# Patient Record
Sex: FEMALE | Race: BLACK OR AFRICAN AMERICAN | NOT HISPANIC OR LATINO | Employment: UNEMPLOYED | URBAN - METROPOLITAN AREA
[De-identification: names, ages, dates, MRNs, and addresses within clinical notes are randomized per-mention and may not be internally consistent; named-entity substitution may affect disease eponyms.]

---

## 2023-07-12 ENCOUNTER — HOSPITAL ENCOUNTER (OUTPATIENT)
Facility: OTHER | Age: 58
Discharge: HOME | End: 2023-07-13
Attending: EMERGENCY MEDICINE | Admitting: HOSPITALIST
Payer: MEDICAID

## 2023-07-12 DIAGNOSIS — R07.9 CHEST PAIN: ICD-10-CM

## 2023-07-12 DIAGNOSIS — I21.4 NSTEMI (NON-ST ELEVATED MYOCARDIAL INFARCTION): Primary | ICD-10-CM

## 2023-07-12 PROBLEM — D72.829 LEUKOCYTOSIS: Status: ACTIVE | Noted: 2023-07-12

## 2023-07-12 PROBLEM — I10 PRIMARY HYPERTENSION: Status: ACTIVE | Noted: 2023-07-12

## 2023-07-12 PROBLEM — N17.9 AKI (ACUTE KIDNEY INJURY): Status: ACTIVE | Noted: 2023-07-12

## 2023-07-12 PROBLEM — J45.909 ASTHMA: Status: ACTIVE | Noted: 2023-07-12

## 2023-07-12 PROBLEM — E78.5 HYPERLIPIDEMIA: Status: ACTIVE | Noted: 2023-07-12

## 2023-07-12 LAB
ALBUMIN SERPL BCP-MCNC: 3.5 G/DL (ref 3.5–5.2)
ALP SERPL-CCNC: 106 U/L (ref 55–135)
ALT SERPL W/O P-5'-P-CCNC: 15 U/L (ref 10–44)
ANION GAP SERPL CALC-SCNC: 12 MMOL/L (ref 8–16)
ANISOCYTOSIS BLD QL SMEAR: SLIGHT
APTT PPP: 24.7 SEC (ref 21–32)
AST SERPL-CCNC: 17 U/L (ref 10–40)
BASOPHILS # BLD AUTO: 0.06 K/UL (ref 0–0.2)
BASOPHILS # BLD AUTO: 0.08 K/UL (ref 0–0.2)
BASOPHILS NFR BLD: 0.4 % (ref 0–1.9)
BASOPHILS NFR BLD: 0.5 % (ref 0–1.9)
BILIRUB SERPL-MCNC: 0.2 MG/DL (ref 0.1–1)
BNP SERPL-MCNC: 15 PG/ML (ref 0–99)
BUN SERPL-MCNC: 22 MG/DL (ref 6–20)
CALCIUM SERPL-MCNC: 9.4 MG/DL (ref 8.7–10.5)
CHLORIDE SERPL-SCNC: 104 MMOL/L (ref 95–110)
CO2 SERPL-SCNC: 25 MMOL/L (ref 23–29)
CREAT SERPL-MCNC: 1.6 MG/DL (ref 0.5–1.4)
DIFFERENTIAL METHOD: ABNORMAL
DIFFERENTIAL METHOD: ABNORMAL
EOSINOPHIL # BLD AUTO: 0.2 K/UL (ref 0–0.5)
EOSINOPHIL # BLD AUTO: 0.2 K/UL (ref 0–0.5)
EOSINOPHIL NFR BLD: 1.3 % (ref 0–8)
EOSINOPHIL NFR BLD: 1.3 % (ref 0–8)
ERYTHROCYTE [DISTWIDTH] IN BLOOD BY AUTOMATED COUNT: 15.6 % (ref 11.5–14.5)
ERYTHROCYTE [DISTWIDTH] IN BLOOD BY AUTOMATED COUNT: 16.4 % (ref 11.5–14.5)
EST. GFR  (NO RACE VARIABLE): 37 ML/MIN/1.73 M^2
GLUCOSE SERPL-MCNC: 109 MG/DL (ref 70–110)
HCT VFR BLD AUTO: 38.3 % (ref 37–48.5)
HCT VFR BLD AUTO: 40.4 % (ref 37–48.5)
HGB BLD-MCNC: 12.4 G/DL (ref 12–16)
HGB BLD-MCNC: 12.9 G/DL (ref 12–16)
IMM GRANULOCYTES # BLD AUTO: 0.11 K/UL (ref 0–0.04)
IMM GRANULOCYTES # BLD AUTO: 0.13 K/UL (ref 0–0.04)
IMM GRANULOCYTES NFR BLD AUTO: 0.8 % (ref 0–0.5)
IMM GRANULOCYTES NFR BLD AUTO: 0.8 % (ref 0–0.5)
INR PPP: 0.9 (ref 0.8–1.2)
LYMPHOCYTES # BLD AUTO: 6.4 K/UL (ref 1–4.8)
LYMPHOCYTES # BLD AUTO: 6.9 K/UL (ref 1–4.8)
LYMPHOCYTES NFR BLD: 43.1 % (ref 18–48)
LYMPHOCYTES NFR BLD: 44.9 % (ref 18–48)
MCH RBC QN AUTO: 28.8 PG (ref 27–31)
MCH RBC QN AUTO: 29.4 PG (ref 27–31)
MCHC RBC AUTO-ENTMCNC: 31.9 G/DL (ref 32–36)
MCHC RBC AUTO-ENTMCNC: 32.4 G/DL (ref 32–36)
MCV RBC AUTO: 89 FL (ref 82–98)
MCV RBC AUTO: 92 FL (ref 82–98)
MONOCYTES # BLD AUTO: 1 K/UL (ref 0.3–1)
MONOCYTES # BLD AUTO: 1.1 K/UL (ref 0.3–1)
MONOCYTES NFR BLD: 6.6 % (ref 4–15)
MONOCYTES NFR BLD: 6.8 % (ref 4–15)
NEUTROPHILS # BLD AUTO: 6.5 K/UL (ref 1.8–7.7)
NEUTROPHILS # BLD AUTO: 7.6 K/UL (ref 1.8–7.7)
NEUTROPHILS NFR BLD: 45.8 % (ref 38–73)
NEUTROPHILS NFR BLD: 47.7 % (ref 38–73)
NRBC BLD-RTO: 0 /100 WBC
NRBC BLD-RTO: 0 /100 WBC
PLATELET # BLD AUTO: 312 K/UL (ref 150–450)
PLATELET # BLD AUTO: 355 K/UL (ref 150–450)
PLATELET BLD QL SMEAR: ABNORMAL
PMV BLD AUTO: 8.9 FL (ref 9.2–12.9)
PMV BLD AUTO: 9.2 FL (ref 9.2–12.9)
POTASSIUM SERPL-SCNC: 3.1 MMOL/L (ref 3.5–5.1)
PROT SERPL-MCNC: 6.9 G/DL (ref 6–8.4)
PROTHROMBIN TIME: 10.4 SEC (ref 9–12.5)
RBC # BLD AUTO: 4.3 M/UL (ref 4–5.4)
RBC # BLD AUTO: 4.39 M/UL (ref 4–5.4)
SODIUM SERPL-SCNC: 141 MMOL/L (ref 136–145)
TROPONIN I SERPL DL<=0.01 NG/ML-MCNC: 2.54 NG/ML (ref 0–0.03)
TROPONIN I SERPL DL<=0.01 NG/ML-MCNC: 2.71 NG/ML (ref 0–0.03)
WBC # BLD AUTO: 14.2 K/UL (ref 3.9–12.7)
WBC # BLD AUTO: 15.92 K/UL (ref 3.9–12.7)

## 2023-07-12 PROCEDURE — 96361 HYDRATE IV INFUSION ADD-ON: CPT

## 2023-07-12 PROCEDURE — 83880 ASSAY OF NATRIURETIC PEPTIDE: CPT | Performed by: NURSE PRACTITIONER

## 2023-07-12 PROCEDURE — 93010 EKG 12-LEAD: ICD-10-PCS | Mod: ,,, | Performed by: INTERNAL MEDICINE

## 2023-07-12 PROCEDURE — 12000002 HC ACUTE/MED SURGE SEMI-PRIVATE ROOM

## 2023-07-12 PROCEDURE — 85610 PROTHROMBIN TIME: CPT | Performed by: PHYSICIAN ASSISTANT

## 2023-07-12 PROCEDURE — 93005 ELECTROCARDIOGRAM TRACING: CPT

## 2023-07-12 PROCEDURE — 96360 HYDRATION IV INFUSION INIT: CPT | Mod: 59

## 2023-07-12 PROCEDURE — 85730 THROMBOPLASTIN TIME PARTIAL: CPT | Performed by: PHYSICIAN ASSISTANT

## 2023-07-12 PROCEDURE — 99285 EMERGENCY DEPT VISIT HI MDM: CPT | Mod: 25

## 2023-07-12 PROCEDURE — G0378 HOSPITAL OBSERVATION PER HR: HCPCS

## 2023-07-12 PROCEDURE — 63600175 PHARM REV CODE 636 W HCPCS: Performed by: PHYSICIAN ASSISTANT

## 2023-07-12 PROCEDURE — 25000003 PHARM REV CODE 250: Performed by: INTERNAL MEDICINE

## 2023-07-12 PROCEDURE — 99223 1ST HOSP IP/OBS HIGH 75: CPT | Mod: ,,, | Performed by: NURSE PRACTITIONER

## 2023-07-12 PROCEDURE — 80061 LIPID PANEL: CPT | Performed by: NURSE PRACTITIONER

## 2023-07-12 PROCEDURE — 36415 COLL VENOUS BLD VENIPUNCTURE: CPT | Performed by: NURSE PRACTITIONER

## 2023-07-12 PROCEDURE — 93010 ELECTROCARDIOGRAM REPORT: CPT | Mod: ,,, | Performed by: INTERNAL MEDICINE

## 2023-07-12 PROCEDURE — 85025 COMPLETE CBC W/AUTO DIFF WBC: CPT | Mod: 91 | Performed by: PHYSICIAN ASSISTANT

## 2023-07-12 PROCEDURE — 25000003 PHARM REV CODE 250: Performed by: PHYSICIAN ASSISTANT

## 2023-07-12 PROCEDURE — 80053 COMPREHEN METABOLIC PANEL: CPT | Performed by: NURSE PRACTITIONER

## 2023-07-12 PROCEDURE — 94761 N-INVAS EAR/PLS OXIMETRY MLT: CPT

## 2023-07-12 PROCEDURE — 99223 PR INITIAL HOSPITAL CARE,LEVL III: ICD-10-PCS | Mod: ,,, | Performed by: NURSE PRACTITIONER

## 2023-07-12 PROCEDURE — 85025 COMPLETE CBC W/AUTO DIFF WBC: CPT | Performed by: NURSE PRACTITIONER

## 2023-07-12 PROCEDURE — 96365 THER/PROPH/DIAG IV INF INIT: CPT

## 2023-07-12 PROCEDURE — 84484 ASSAY OF TROPONIN QUANT: CPT | Performed by: NURSE PRACTITIONER

## 2023-07-12 RX ORDER — ATORVASTATIN CALCIUM 20 MG/1
80 TABLET, FILM COATED ORAL DAILY
Status: DISCONTINUED | OUTPATIENT
Start: 2023-07-12 | End: 2023-07-13 | Stop reason: HOSPADM

## 2023-07-12 RX ORDER — HYDROXYZINE HYDROCHLORIDE 25 MG/1
25 TABLET, FILM COATED ORAL DAILY
Status: DISCONTINUED | OUTPATIENT
Start: 2023-07-13 | End: 2023-07-13 | Stop reason: HOSPADM

## 2023-07-12 RX ORDER — LOSARTAN POTASSIUM 50 MG/1
50 TABLET ORAL DAILY
Status: ON HOLD | COMMUNITY
End: 2023-07-13 | Stop reason: HOSPADM

## 2023-07-12 RX ORDER — AMLODIPINE BESYLATE 10 MG/1
5 TABLET ORAL DAILY
COMMUNITY

## 2023-07-12 RX ORDER — FLUTICASONE FUROATE AND VILANTEROL 100; 25 UG/1; UG/1
1 POWDER RESPIRATORY (INHALATION) DAILY
Status: DISCONTINUED | OUTPATIENT
Start: 2023-07-13 | End: 2023-07-13 | Stop reason: HOSPADM

## 2023-07-12 RX ORDER — FLUOXETINE HYDROCHLORIDE 20 MG/1
40 CAPSULE ORAL DAILY
Status: DISCONTINUED | OUTPATIENT
Start: 2023-07-13 | End: 2023-07-13 | Stop reason: HOSPADM

## 2023-07-12 RX ORDER — PANTOPRAZOLE SODIUM 40 MG/1
40 TABLET, DELAYED RELEASE ORAL DAILY
Status: DISCONTINUED | OUTPATIENT
Start: 2023-07-13 | End: 2023-07-13 | Stop reason: HOSPADM

## 2023-07-12 RX ORDER — AMLODIPINE BESYLATE 5 MG/1
10 TABLET ORAL DAILY
Status: DISCONTINUED | OUTPATIENT
Start: 2023-07-13 | End: 2023-07-13 | Stop reason: HOSPADM

## 2023-07-12 RX ORDER — SODIUM CHLORIDE 0.9 % (FLUSH) 0.9 %
10 SYRINGE (ML) INJECTION
Status: DISCONTINUED | OUTPATIENT
Start: 2023-07-13 | End: 2023-07-13 | Stop reason: HOSPADM

## 2023-07-12 RX ORDER — MIRTAZAPINE 15 MG/1
15 TABLET, FILM COATED ORAL NIGHTLY
Status: DISCONTINUED | OUTPATIENT
Start: 2023-07-13 | End: 2023-07-13 | Stop reason: HOSPADM

## 2023-07-12 RX ORDER — HYDROCODONE BITARTRATE AND ACETAMINOPHEN 5; 325 MG/1; MG/1
1 TABLET ORAL EVERY 4 HOURS PRN
Status: DISCONTINUED | OUTPATIENT
Start: 2023-07-13 | End: 2023-07-13 | Stop reason: HOSPADM

## 2023-07-12 RX ORDER — ACETAMINOPHEN 325 MG/1
650 TABLET ORAL EVERY 4 HOURS PRN
Status: DISCONTINUED | OUTPATIENT
Start: 2023-07-13 | End: 2023-07-13 | Stop reason: HOSPADM

## 2023-07-12 RX ORDER — BISACODYL 10 MG
10 SUPPOSITORY, RECTAL RECTAL DAILY PRN
Status: DISCONTINUED | OUTPATIENT
Start: 2023-07-13 | End: 2023-07-13 | Stop reason: HOSPADM

## 2023-07-12 RX ORDER — MELOXICAM 7.5 MG/1
7.5 TABLET ORAL DAILY
Status: ON HOLD | COMMUNITY
End: 2023-07-13 | Stop reason: HOSPADM

## 2023-07-12 RX ORDER — SODIUM CHLORIDE 9 MG/ML
INJECTION, SOLUTION INTRAVENOUS CONTINUOUS
Status: DISCONTINUED | OUTPATIENT
Start: 2023-07-13 | End: 2023-07-13

## 2023-07-12 RX ORDER — GABAPENTIN 300 MG/1
600 CAPSULE ORAL 3 TIMES DAILY
Status: DISCONTINUED | OUTPATIENT
Start: 2023-07-13 | End: 2023-07-13 | Stop reason: HOSPADM

## 2023-07-12 RX ORDER — HEPARIN SODIUM,PORCINE/D5W 25000/250
0-40 INTRAVENOUS SOLUTION INTRAVENOUS CONTINUOUS
Status: DISCONTINUED | OUTPATIENT
Start: 2023-07-12 | End: 2023-07-13

## 2023-07-12 RX ORDER — MIRTAZAPINE 15 MG/1
15 TABLET, FILM COATED ORAL NIGHTLY
COMMUNITY

## 2023-07-12 RX ORDER — TALC
6 POWDER (GRAM) TOPICAL NIGHTLY PRN
Status: DISCONTINUED | OUTPATIENT
Start: 2023-07-13 | End: 2023-07-13 | Stop reason: HOSPADM

## 2023-07-12 RX ORDER — METOPROLOL SUCCINATE 25 MG/1
25 TABLET, EXTENDED RELEASE ORAL DAILY
Status: DISCONTINUED | OUTPATIENT
Start: 2023-07-12 | End: 2023-07-13 | Stop reason: HOSPADM

## 2023-07-12 RX ORDER — ONDANSETRON 2 MG/ML
4 INJECTION INTRAMUSCULAR; INTRAVENOUS EVERY 8 HOURS PRN
Status: DISCONTINUED | OUTPATIENT
Start: 2023-07-13 | End: 2023-07-13 | Stop reason: HOSPADM

## 2023-07-12 RX ORDER — HYDROXYZINE HYDROCHLORIDE 25 MG/1
25 TABLET, FILM COATED ORAL DAILY
COMMUNITY

## 2023-07-12 RX ORDER — ONDANSETRON 8 MG/1
8 TABLET, ORALLY DISINTEGRATING ORAL EVERY 8 HOURS PRN
Status: DISCONTINUED | OUTPATIENT
Start: 2023-07-13 | End: 2023-07-13 | Stop reason: HOSPADM

## 2023-07-12 RX ORDER — POLYETHYLENE GLYCOL 3350 17 G/17G
17 POWDER, FOR SOLUTION ORAL 2 TIMES DAILY PRN
Status: DISCONTINUED | OUTPATIENT
Start: 2023-07-13 | End: 2023-07-13 | Stop reason: HOSPADM

## 2023-07-12 RX ORDER — ATORVASTATIN CALCIUM 20 MG/1
80 TABLET, FILM COATED ORAL DAILY
Status: DISCONTINUED | OUTPATIENT
Start: 2023-07-13 | End: 2023-07-12

## 2023-07-12 RX ORDER — FLUOXETINE HYDROCHLORIDE 40 MG/1
40 CAPSULE ORAL DAILY
COMMUNITY

## 2023-07-12 RX ORDER — AMOXICILLIN 250 MG
1 CAPSULE ORAL 2 TIMES DAILY
Status: DISCONTINUED | OUTPATIENT
Start: 2023-07-13 | End: 2023-07-13 | Stop reason: HOSPADM

## 2023-07-12 RX ORDER — DIAZEPAM 5 MG/1
5 TABLET ORAL
Status: DISCONTINUED | OUTPATIENT
Start: 2023-07-13 | End: 2023-07-13

## 2023-07-12 RX ORDER — POTASSIUM CHLORIDE 750 MG/1
30 TABLET, EXTENDED RELEASE ORAL
Status: COMPLETED | OUTPATIENT
Start: 2023-07-12 | End: 2023-07-13

## 2023-07-12 RX ORDER — CETIRIZINE HYDROCHLORIDE 10 MG/1
10 TABLET ORAL DAILY
Status: DISCONTINUED | OUTPATIENT
Start: 2023-07-13 | End: 2023-07-13 | Stop reason: HOSPADM

## 2023-07-12 RX ORDER — NITROGLYCERIN 0.4 MG/1
0.4 TABLET SUBLINGUAL EVERY 5 MIN PRN
Status: DISCONTINUED | OUTPATIENT
Start: 2023-07-13 | End: 2023-07-13 | Stop reason: HOSPADM

## 2023-07-12 RX ORDER — NAPROXEN SODIUM 220 MG/1
81 TABLET, FILM COATED ORAL DAILY
Status: DISCONTINUED | OUTPATIENT
Start: 2023-07-13 | End: 2023-07-13 | Stop reason: HOSPADM

## 2023-07-12 RX ORDER — ASPIRIN 325 MG
325 TABLET, DELAYED RELEASE (ENTERIC COATED) ORAL
Status: COMPLETED | OUTPATIENT
Start: 2023-07-12 | End: 2023-07-12

## 2023-07-12 RX ADMIN — SODIUM CHLORIDE 1000 ML: 0.9 INJECTION, SOLUTION INTRAVENOUS at 08:07

## 2023-07-12 RX ADMIN — ATORVASTATIN CALCIUM 80 MG: 20 TABLET, FILM COATED ORAL at 10:07

## 2023-07-12 RX ADMIN — HEPARIN SODIUM 12 UNITS/KG/HR: 10000 INJECTION, SOLUTION INTRAVENOUS at 10:07

## 2023-07-12 RX ADMIN — ASPIRIN 325 MG: 325 TABLET, COATED ORAL at 09:07

## 2023-07-12 RX ADMIN — METOPROLOL SUCCINATE 25 MG: 25 TABLET, EXTENDED RELEASE ORAL at 10:07

## 2023-07-12 NOTE — FIRST PROVIDER EVALUATION
Emergency Department TeleTriage Encounter Note      CHIEF COMPLAINT    Chief Complaint   Patient presents with    chest heaviness     Reports right side CP that started yesterday. Endorses indigestion yesterday, took Tums with no relief. Denies SOB       VITAL SIGNS   Initial Vitals [07/12/23 1845]   BP Pulse Resp Temp SpO2   120/82 91 18 98 °F (36.7 °C) 99 %      MAP       --            ALLERGIES    Review of patient's allergies indicates:  No Known Allergies    PROVIDER TRIAGE NOTE  This is a teletriage evaluation of a 58 y.o. female presenting to the ED with c/o right sided chest pain/heaviness since last night. Limited physical exam via telehealth: The patient is awake, alert, answering questions appropriately and is not in respiratory distress. Initial orders will be placed and care will be transferred to an alternate provider when patient is roomed for a full evaluation. Any additional orders and the final disposition will be determined by that provider.         ORDERS  Labs Reviewed - No data to display    ED Orders (720h ago, onward)      Start Ordered     Status Ordering Provider    07/12/23 1856 07/12/23 1856  Vital signs  Every 15 min         Ordered NATTY TEIXEIRA    07/12/23 1856 07/12/23 1856  Cardiac Monitoring - Adult  Continuous        Comments: Notify Physician If:    Ordered NATTY TEIXEIRA    07/12/23 1856 07/12/23 1856  Pulse Oximetry Continuous  Continuous         Ordered NATTY TEIXEIRA    07/12/23 1856 07/12/23 1856  Diet NPO  Diet effective now         Ordered NATTY TEIXEIRA    Unscheduled 07/12/23 1856  Saline lock IV  Once         Ordered NATTY TEIXEIRA    Unscheduled 07/12/23 1856  EKG 12-lead  Once        Comments: Do not perform if previously done during this visit/ triage    Ordered NATTY TEIXEIRA    Unscheduled 07/12/23 1856  CBC auto differential  STAT         Ordered NATTY TEIXEIRA    Unscheduled 07/12/23 1856  Comprehensive metabolic panel  STAT         Ordered  NATTY TEIXEIRA.    Unscheduled 07/12/23 1856  Troponin I #1  STAT         Ordered NATTY TEIXEIRA    Unscheduled 07/12/23 1856  Troponin I #2  Once Timed         Ordered NATTY TEIXEIRA    Unscheduled 07/12/23 1856  B-Type natriuretic peptide (BNP)  STAT         Ordered NATTY TEIXEIRA    Unscheduled 07/12/23 1856  X-Ray Chest PA And Lateral  1 time imaging         Ordered NATTY TEIXEIRA              Virtual Visit Note: The provider triage portion of this emergency department evaluation and documentation was performed via [employer], a HIPAA-compliant telemedicine application, in concert with a tele-presenter in the room. A face to face patient evaluation with one of my colleagues will occur once the patient is placed in an emergency department room.      DISCLAIMER: This note was prepared with [employer] voice recognition transcription software. Garbled syntax, mangled pronouns, and other bizarre constructions may be attributed to that software system.

## 2023-07-13 ENCOUNTER — TELEPHONE (OUTPATIENT)
Dept: PODIATRY | Facility: CLINIC | Age: 58
End: 2023-07-13
Payer: MEDICAID

## 2023-07-13 VITALS
OXYGEN SATURATION: 100 % | RESPIRATION RATE: 18 BRPM | HEART RATE: 66 BPM | TEMPERATURE: 97 F | HEIGHT: 64 IN | WEIGHT: 213.63 LBS | BODY MASS INDEX: 36.47 KG/M2 | DIASTOLIC BLOOD PRESSURE: 69 MMHG | SYSTOLIC BLOOD PRESSURE: 137 MMHG

## 2023-07-13 PROBLEM — M54.2 CHRONIC NECK PAIN: Status: ACTIVE | Noted: 2023-07-13

## 2023-07-13 PROBLEM — F41.9 ANXIETY AND DEPRESSION: Status: ACTIVE | Noted: 2023-07-13

## 2023-07-13 PROBLEM — F32.A ANXIETY AND DEPRESSION: Status: ACTIVE | Noted: 2023-07-13

## 2023-07-13 PROBLEM — G89.29 CHRONIC NECK PAIN: Status: ACTIVE | Noted: 2023-07-13

## 2023-07-13 LAB
ABO + RH BLD: NORMAL
ALBUMIN SERPL BCP-MCNC: 3.3 G/DL (ref 3.5–5.2)
ALP SERPL-CCNC: 111 U/L (ref 55–135)
ALT SERPL W/O P-5'-P-CCNC: 13 U/L (ref 10–44)
ANION GAP SERPL CALC-SCNC: 8 MMOL/L (ref 8–16)
APTT PPP: 61.1 SEC (ref 21–32)
APTT PPP: 61.1 SEC (ref 21–32)
ASCENDING AORTA: 3.02 CM
AST SERPL-CCNC: 14 U/L (ref 10–40)
AV INDEX (PROSTH): 0.53
AV MEAN GRADIENT: 4 MMHG
AV PEAK GRADIENT: 7 MMHG
AV VALVE AREA: 1.63 CM2
AV VELOCITY RATIO: 0.53
BASOPHILS # BLD AUTO: 0.08 K/UL (ref 0–0.2)
BASOPHILS NFR BLD: 0.5 % (ref 0–1.9)
BILIRUB SERPL-MCNC: 0.4 MG/DL (ref 0.1–1)
BLD GP AB SCN CELLS X3 SERPL QL: NORMAL
BSA FOR ECHO PROCEDURE: 1.97 M2
BUN SERPL-MCNC: 21 MG/DL (ref 6–20)
CALCIUM SERPL-MCNC: 9.1 MG/DL (ref 8.7–10.5)
CHLORIDE SERPL-SCNC: 105 MMOL/L (ref 95–110)
CHOLEST SERPL-MCNC: 168 MG/DL (ref 120–199)
CHOLEST/HDLC SERPL: 3.8 {RATIO} (ref 2–5)
CO2 SERPL-SCNC: 27 MMOL/L (ref 23–29)
CREAT SERPL-MCNC: 1.1 MG/DL (ref 0.5–1.4)
CV ECHO LV RWT: 0.44 CM
DIFFERENTIAL METHOD: ABNORMAL
DOP CALC AO PEAK VEL: 1.32 M/S
DOP CALC AO VTI: 26.4 CM
DOP CALC LVOT AREA: 3 CM2
DOP CALC LVOT DIAMETER: 1.97 CM
DOP CALC LVOT PEAK VEL: 0.7 M/S
DOP CALC LVOT STROKE VOLUME: 42.96 CM3
DOP CALCLVOT PEAK VEL VTI: 14.1 CM
E WAVE DECELERATION TIME: 213.37 MSEC
E/A RATIO: 1.15
E/E' RATIO: 8.71 M/S
ECHO LV POSTERIOR WALL: 0.88 CM (ref 0.6–1.1)
EJECTION FRACTION: 62 %
EOSINOPHIL # BLD AUTO: 0.3 K/UL (ref 0–0.5)
EOSINOPHIL NFR BLD: 1.9 % (ref 0–8)
ERYTHROCYTE [DISTWIDTH] IN BLOOD BY AUTOMATED COUNT: 15.5 % (ref 11.5–14.5)
EST. GFR  (NO RACE VARIABLE): 58 ML/MIN/1.73 M^2
ESTIMATED AVG GLUCOSE: 126 MG/DL (ref 68–131)
FRACTIONAL SHORTENING: 33 % (ref 28–44)
GLUCOSE SERPL-MCNC: 95 MG/DL (ref 70–110)
HBA1C MFR BLD: 6 % (ref 4–5.6)
HCT VFR BLD AUTO: 36.5 % (ref 37–48.5)
HDLC SERPL-MCNC: 44 MG/DL (ref 40–75)
HDLC SERPL: 26.2 % (ref 20–50)
HGB BLD-MCNC: 11.7 G/DL (ref 12–16)
IMM GRANULOCYTES # BLD AUTO: 0.12 K/UL (ref 0–0.04)
IMM GRANULOCYTES NFR BLD AUTO: 0.8 % (ref 0–0.5)
INTERVENTRICULAR SEPTUM: 0.92 CM (ref 0.6–1.1)
IVRT: 121.79 MSEC
LA MAJOR: 4.34 CM
LA MINOR: 3.94 CM
LA WIDTH: 3.7 CM
LDLC SERPL CALC-MCNC: 95.2 MG/DL (ref 63–159)
LEFT ATRIUM SIZE: 2.17 CM
LEFT ATRIUM VOLUME INDEX MOD: 19.4 ML/M2
LEFT ATRIUM VOLUME INDEX: 14.8 ML/M2
LEFT ATRIUM VOLUME MOD: 37 CM3
LEFT ATRIUM VOLUME: 28.19 CM3
LEFT INTERNAL DIMENSION IN SYSTOLE: 2.7 CM (ref 2.1–4)
LEFT VENTRICLE DIASTOLIC VOLUME INDEX: 37.39 ML/M2
LEFT VENTRICLE DIASTOLIC VOLUME: 71.42 ML
LEFT VENTRICLE MASS INDEX: 58 G/M2
LEFT VENTRICLE SYSTOLIC VOLUME INDEX: 14.1 ML/M2
LEFT VENTRICLE SYSTOLIC VOLUME: 27.02 ML
LEFT VENTRICULAR INTERNAL DIMENSION IN DIASTOLE: 4.03 CM (ref 3.5–6)
LEFT VENTRICULAR MASS: 111.01 G
LV LATERAL E/E' RATIO: 10.17 M/S
LV SEPTAL E/E' RATIO: 7.63 M/S
LVOT MG: 1.06 MMHG
LVOT MV: 0.48 CM/S
LYMPHOCYTES # BLD AUTO: 7.2 K/UL (ref 1–4.8)
LYMPHOCYTES NFR BLD: 48 % (ref 18–48)
MAGNESIUM SERPL-MCNC: 2.2 MG/DL (ref 1.6–2.6)
MCH RBC QN AUTO: 28.4 PG (ref 27–31)
MCHC RBC AUTO-ENTMCNC: 32.1 G/DL (ref 32–36)
MCV RBC AUTO: 89 FL (ref 82–98)
MONOCYTES # BLD AUTO: 0.8 K/UL (ref 0.3–1)
MONOCYTES NFR BLD: 5.6 % (ref 4–15)
MV PEAK A VEL: 0.53 M/S
MV PEAK E VEL: 0.61 M/S
MV STENOSIS PRESSURE HALF TIME: 61.88 MS
MV VALVE AREA P 1/2 METHOD: 3.56 CM2
NEUTROPHILS # BLD AUTO: 6.4 K/UL (ref 1.8–7.7)
NEUTROPHILS NFR BLD: 43.2 % (ref 38–73)
NONHDLC SERPL-MCNC: 124 MG/DL
NRBC BLD-RTO: 0 /100 WBC
PISA MRMAX VEL: 2.15 M/S
PISA TR MAX VEL: 1.53 M/S
PLATELET # BLD AUTO: 318 K/UL (ref 150–450)
PMV BLD AUTO: 8.8 FL (ref 9.2–12.9)
POTASSIUM SERPL-SCNC: 4 MMOL/L (ref 3.5–5.1)
PROT SERPL-MCNC: 6.4 G/DL (ref 6–8.4)
RA MAJOR: 3.76 CM
RA WIDTH: 3.4 CM
RBC # BLD AUTO: 4.12 M/UL (ref 4–5.4)
RIGHT VENTRICULAR END-DIASTOLIC DIMENSION: 3.12 CM
SINUS: 3.1 CM
SODIUM SERPL-SCNC: 140 MMOL/L (ref 136–145)
SPECIMEN OUTDATE: NORMAL
STJ: 2.29 CM
TDI LATERAL: 0.06 M/S
TDI SEPTAL: 0.08 M/S
TDI: 0.07 M/S
TR MAX PG: 9 MMHG
TRIGL SERPL-MCNC: 144 MG/DL (ref 30–150)
TROPONIN I SERPL DL<=0.01 NG/ML-MCNC: 1.19 NG/ML (ref 0–0.03)
TROPONIN I SERPL DL<=0.01 NG/ML-MCNC: 1.61 NG/ML (ref 0–0.03)
TSH SERPL DL<=0.005 MIU/L-ACNC: 1.41 UIU/ML (ref 0.4–4)
WBC # BLD AUTO: 14.9 K/UL (ref 3.9–12.7)

## 2023-07-13 PROCEDURE — 25000003 PHARM REV CODE 250: Performed by: NURSE PRACTITIONER

## 2023-07-13 PROCEDURE — 83036 HEMOGLOBIN GLYCOSYLATED A1C: CPT | Performed by: NURSE PRACTITIONER

## 2023-07-13 PROCEDURE — 94761 N-INVAS EAR/PLS OXIMETRY MLT: CPT

## 2023-07-13 PROCEDURE — 36415 COLL VENOUS BLD VENIPUNCTURE: CPT | Performed by: NURSE PRACTITIONER

## 2023-07-13 PROCEDURE — 63600175 PHARM REV CODE 636 W HCPCS: Performed by: INTERNAL MEDICINE

## 2023-07-13 PROCEDURE — 99222 PR INITIAL HOSPITAL CARE,LEVL II: ICD-10-PCS | Mod: 25,,, | Performed by: INTERNAL MEDICINE

## 2023-07-13 PROCEDURE — 93005 ELECTROCARDIOGRAM TRACING: CPT

## 2023-07-13 PROCEDURE — 86900 BLOOD TYPING SEROLOGIC ABO: CPT | Performed by: NURSE PRACTITIONER

## 2023-07-13 PROCEDURE — 93454 CORONARY ARTERY ANGIO S&I: CPT | Performed by: INTERNAL MEDICINE

## 2023-07-13 PROCEDURE — 36415 COLL VENOUS BLD VENIPUNCTURE: CPT | Performed by: HOSPITALIST

## 2023-07-13 PROCEDURE — 93010 EKG 12-LEAD: ICD-10-PCS | Mod: ,,, | Performed by: INTERNAL MEDICINE

## 2023-07-13 PROCEDURE — 93010 ELECTROCARDIOGRAM REPORT: CPT | Mod: ,,, | Performed by: INTERNAL MEDICINE

## 2023-07-13 PROCEDURE — 99239 HOSP IP/OBS DSCHRG MGMT >30: CPT | Mod: ,,, | Performed by: HOSPITALIST

## 2023-07-13 PROCEDURE — 25000003 PHARM REV CODE 250: Performed by: INTERNAL MEDICINE

## 2023-07-13 PROCEDURE — 25000242 PHARM REV CODE 250 ALT 637 W/ HCPCS: Performed by: INTERNAL MEDICINE

## 2023-07-13 PROCEDURE — 96366 THER/PROPH/DIAG IV INF ADDON: CPT

## 2023-07-13 PROCEDURE — 85025 COMPLETE CBC W/AUTO DIFF WBC: CPT | Performed by: PHYSICIAN ASSISTANT

## 2023-07-13 PROCEDURE — C1769 GUIDE WIRE: HCPCS | Performed by: INTERNAL MEDICINE

## 2023-07-13 PROCEDURE — C1887 CATHETER, GUIDING: HCPCS | Performed by: INTERNAL MEDICINE

## 2023-07-13 PROCEDURE — 94640 AIRWAY INHALATION TREATMENT: CPT

## 2023-07-13 PROCEDURE — 93454 PR CATH PLACE/CORONARY ANGIO, IMG SUPER/INTERP: ICD-10-PCS | Mod: 26,,, | Performed by: INTERNAL MEDICINE

## 2023-07-13 PROCEDURE — 80053 COMPREHEN METABOLIC PANEL: CPT | Performed by: NURSE PRACTITIONER

## 2023-07-13 PROCEDURE — 93454 CORONARY ARTERY ANGIO S&I: CPT | Mod: 26,,, | Performed by: INTERNAL MEDICINE

## 2023-07-13 PROCEDURE — 25000242 PHARM REV CODE 250 ALT 637 W/ HCPCS: Performed by: NURSE PRACTITIONER

## 2023-07-13 PROCEDURE — G0378 HOSPITAL OBSERVATION PER HR: HCPCS

## 2023-07-13 PROCEDURE — 84443 ASSAY THYROID STIM HORMONE: CPT | Performed by: NURSE PRACTITIONER

## 2023-07-13 PROCEDURE — 99222 1ST HOSP IP/OBS MODERATE 55: CPT | Mod: 25,,, | Performed by: INTERNAL MEDICINE

## 2023-07-13 PROCEDURE — 96361 HYDRATE IV INFUSION ADD-ON: CPT | Mod: 59

## 2023-07-13 PROCEDURE — C1894 INTRO/SHEATH, NON-LASER: HCPCS | Performed by: INTERNAL MEDICINE

## 2023-07-13 PROCEDURE — 99239 PR HOSPITAL DISCHARGE DAY,>30 MIN: ICD-10-PCS | Mod: ,,, | Performed by: HOSPITALIST

## 2023-07-13 PROCEDURE — 25500020 PHARM REV CODE 255: Performed by: INTERNAL MEDICINE

## 2023-07-13 PROCEDURE — 85730 THROMBOPLASTIN TIME PARTIAL: CPT | Performed by: PHYSICIAN ASSISTANT

## 2023-07-13 PROCEDURE — 83735 ASSAY OF MAGNESIUM: CPT | Performed by: NURSE PRACTITIONER

## 2023-07-13 PROCEDURE — 84484 ASSAY OF TROPONIN QUANT: CPT | Mod: 91 | Performed by: NURSE PRACTITIONER

## 2023-07-13 RX ORDER — NITROGLYCERIN 0.4 MG/1
0.4 TABLET SUBLINGUAL EVERY 5 MIN PRN
Qty: 25 TABLET | Refills: 1 | Status: SHIPPED | OUTPATIENT
Start: 2023-07-13 | End: 2024-07-12

## 2023-07-13 RX ORDER — ONDANSETRON 8 MG/1
8 TABLET, ORALLY DISINTEGRATING ORAL EVERY 8 HOURS PRN
Status: DISCONTINUED | OUTPATIENT
Start: 2023-07-13 | End: 2023-07-13 | Stop reason: HOSPADM

## 2023-07-13 RX ORDER — VERAPAMIL HYDROCHLORIDE 2.5 MG/ML
INJECTION, SOLUTION INTRAVENOUS
Status: DISCONTINUED | OUTPATIENT
Start: 2023-07-13 | End: 2023-07-13 | Stop reason: HOSPADM

## 2023-07-13 RX ORDER — ACETAMINOPHEN 325 MG/1
650 TABLET ORAL EVERY 4 HOURS PRN
Status: DISCONTINUED | OUTPATIENT
Start: 2023-07-13 | End: 2023-07-13 | Stop reason: HOSPADM

## 2023-07-13 RX ORDER — SODIUM CHLORIDE 9 MG/ML
INJECTION, SOLUTION INTRAVENOUS CONTINUOUS
Status: DISCONTINUED | OUTPATIENT
Start: 2023-07-13 | End: 2023-07-13 | Stop reason: HOSPADM

## 2023-07-13 RX ORDER — MIDAZOLAM HYDROCHLORIDE 1 MG/ML
INJECTION INTRAMUSCULAR; INTRAVENOUS
Status: DISCONTINUED | OUTPATIENT
Start: 2023-07-13 | End: 2023-07-13 | Stop reason: HOSPADM

## 2023-07-13 RX ORDER — ATORVASTATIN CALCIUM 80 MG/1
80 TABLET, FILM COATED ORAL NIGHTLY
Qty: 30 TABLET | Refills: 1 | Status: SHIPPED | OUTPATIENT
Start: 2023-07-13 | End: 2024-07-12

## 2023-07-13 RX ORDER — HEPARIN SOD,PORCINE/0.9 % NACL 1000/500ML
INTRAVENOUS SOLUTION INTRAVENOUS
Status: DISCONTINUED | OUTPATIENT
Start: 2023-07-13 | End: 2023-07-13 | Stop reason: HOSPADM

## 2023-07-13 RX ORDER — FENTANYL CITRATE 50 UG/ML
INJECTION, SOLUTION INTRAMUSCULAR; INTRAVENOUS
Status: DISCONTINUED | OUTPATIENT
Start: 2023-07-13 | End: 2023-07-13 | Stop reason: HOSPADM

## 2023-07-13 RX ORDER — LIDOCAINE HYDROCHLORIDE 10 MG/ML
INJECTION, SOLUTION EPIDURAL; INFILTRATION; INTRACAUDAL; PERINEURAL
Status: DISCONTINUED | OUTPATIENT
Start: 2023-07-13 | End: 2023-07-13 | Stop reason: HOSPADM

## 2023-07-13 RX ORDER — NAPROXEN SODIUM 220 MG/1
81 TABLET, FILM COATED ORAL DAILY
Qty: 30 TABLET | Refills: 1 | Status: SHIPPED | OUTPATIENT
Start: 2023-07-14 | End: 2024-07-13

## 2023-07-13 RX ORDER — SODIUM CHLORIDE 9 MG/ML
INJECTION, SOLUTION INTRAVENOUS CONTINUOUS
Status: ACTIVE | OUTPATIENT
Start: 2023-07-13 | End: 2023-07-13

## 2023-07-13 RX ORDER — METOPROLOL SUCCINATE 25 MG/1
25 TABLET, EXTENDED RELEASE ORAL DAILY
Qty: 30 TABLET | Refills: 1 | Status: SHIPPED | OUTPATIENT
Start: 2023-07-14 | End: 2024-07-13

## 2023-07-13 RX ADMIN — SODIUM CHLORIDE: 9 INJECTION, SOLUTION INTRAVENOUS at 11:07

## 2023-07-13 RX ADMIN — MIRTAZAPINE 15 MG: 15 TABLET, FILM COATED ORAL at 12:07

## 2023-07-13 RX ADMIN — SODIUM CHLORIDE: 0.9 INJECTION, SOLUTION INTRAVENOUS at 01:07

## 2023-07-13 RX ADMIN — GABAPENTIN 600 MG: 300 CAPSULE ORAL at 11:07

## 2023-07-13 RX ADMIN — POTASSIUM CHLORIDE 30 MEQ: 750 TABLET, EXTENDED RELEASE ORAL at 12:07

## 2023-07-13 RX ADMIN — POTASSIUM CHLORIDE 30 MEQ: 750 TABLET, EXTENDED RELEASE ORAL at 02:07

## 2023-07-13 RX ADMIN — ASPIRIN 81 MG CHEWABLE TABLET 81 MG: 81 TABLET CHEWABLE at 11:07

## 2023-07-13 RX ADMIN — PANTOPRAZOLE SODIUM 40 MG: 40 TABLET, DELAYED RELEASE ORAL at 11:07

## 2023-07-13 RX ADMIN — METOPROLOL SUCCINATE 25 MG: 25 TABLET, EXTENDED RELEASE ORAL at 11:07

## 2023-07-13 RX ADMIN — SENNOSIDES AND DOCUSATE SODIUM 1 TABLET: 50; 8.6 TABLET ORAL at 11:07

## 2023-07-13 RX ADMIN — ATORVASTATIN CALCIUM 80 MG: 20 TABLET, FILM COATED ORAL at 11:07

## 2023-07-13 RX ADMIN — FLUOXETINE 40 MG: 20 CAPSULE ORAL at 11:07

## 2023-07-13 RX ADMIN — FLUTICASONE FUROATE AND VILANTEROL TRIFENATATE 1 PUFF: 100; 25 POWDER RESPIRATORY (INHALATION) at 08:07

## 2023-07-13 RX ADMIN — HYDROXYZINE HYDROCHLORIDE 25 MG: 25 TABLET ORAL at 11:07

## 2023-07-13 RX ADMIN — GABAPENTIN 600 MG: 300 CAPSULE ORAL at 04:07

## 2023-07-13 RX ADMIN — AMLODIPINE BESYLATE 10 MG: 5 TABLET ORAL at 11:07

## 2023-07-13 RX ADMIN — CETIRIZINE HYDROCHLORIDE 10 MG: 10 TABLET, FILM COATED ORAL at 11:07

## 2023-07-13 NOTE — PROGRESS NOTES
Patient resting well  On room air Meter dose inhaler given, no adverse reaction noted at this time

## 2023-07-13 NOTE — PLAN OF CARE
07/13/23 1314   Final Note   Assessment Type Final Discharge Note   Anticipated Discharge Disposition Home   Hospital Resources/Appts/Education Provided Provided patient/caregiver with written discharge plan information;Appointments scheduled by Navigator/Coordinator;Appointments scheduled and added to AVS   Post-Acute Status   Discharge Delays None known at this time     SW met with patient at bedside to discuss discharge plan. Patient will see PCP 7/18/2023 11AM. Sent message to cardiology for sooner appointment. All follow ups added to AVS. Patient family/friend to provide transportation home at discharge. All CM needs have been met.

## 2023-07-13 NOTE — NURSING
Patient being discharged home, instructions reviewed with patient, IV and telemetry removed, awaiting transport.

## 2023-07-13 NOTE — ASSESSMENT & PLAN NOTE
Leukocytosis  HTN  HLD   -strong family history of CAD/MI with personal risk factors of HTN, HLD, obesity  -at admission HDS and afebrile. CBC with WBC 16, H/H stable. Chemistry with K 3.1 (repleted), BUN 22, SCr 1.6. LFTs WNL. BNP 15. Troponin 2.711 >> 2.536. CXR without acute cardiopulmonary process. EKG with NSR and nonspecific ST an T wave changes (no prior for comparison).   -leukocytosis likely reactionary in setting of NSTEMI and recent illness/prednisone use  -given ASA in ED >>> continue daily dosing  -on heparin gtt per protocol >>> continue   -metoprolol initiated in ED  >> monitor for toleration  -hold home losartan due to KITA >> resume when indicated   -continue home amlodipine  -continue home atorvastatin at high intensity dosing   -continue IVF hydration  -dose/medication adjustment as appropriate   -NPO at [place]  -Cardiology consulted >>> LHC pending in AM  -TTE ordered >> please follow   -TSH, HgA1C, lipid panel in AM  -continue tele monitoring  -EKG daily and PRN concerns/chest pain  -SL NTG PRN chest pain  -PRN supportive care needed  -continue lab trending  -monitor

## 2023-07-13 NOTE — HPI
Ms. Licona is a 58 YOF with PMHx of HTN, HLD, anxiety/depression, and asthma. She presents to ED with complaints of burning mid-sternal chest and epigastric pain that began last night with radiation to R shoulder with associated nausea and diaphoresis. She took Tums last night without relief in pain. She notes that pain returned today prompting her ED visit.     She reports that she has been feeling fatigued with malaise for last few weeks and was seen two times at urgent care with diagnosis of otitis media. She initially took bactrim for OM however developed rash and was given cream, alternative antibiotic (unable to recall name), and prednisone which she completed course.      She denies SOB, MAYER,  abdominal pain, V/D, constipation, urinary symptoms or hematuria, decreases UOP, changes in bowel habits or blood in stool, decreased appetite, changes in PO intake, light headiness, dizziness, seizures, or syncope. She lives with family, uses no ambulatory aides, is independent in ADLs. Denies alcohol, tobacco, or illicit drug use. She has family history of CAD/MI in mother, father, and sister.     In the ED she is HDS and afebrile. CBC with WBC 16, H/H stable. Chemistry with K 3.1 (repleted), BUN 22, SCr 1.6. LFTs WNL. BNP 15. Troponin 2.711 >> 2.536. CXR without acute cardiopulmonary process. EKG with NSR and nonspecific ST an T wave changes (no prior for comparison). She was given ASA, initiated on heparin gtt, given IVFs, and Cardiology consulted. The patient was admitted to the Hospital Medicine Service for further evaluation and management.

## 2023-07-13 NOTE — SUBJECTIVE & OBJECTIVE
Past Medical History:   Diagnosis Date    Asthma     Hypertension        Past Surgical History:   Procedure Laterality Date    CHOLECYSTECTOMY      TUBAL LIGATION         Review of patient's allergies indicates:  No Known Allergies    No current facility-administered medications on file prior to encounter.     Current Outpatient Medications on File Prior to Encounter   Medication Sig    amLODIPine (NORVASC) 10 MG tablet Take 10 mg by mouth once daily.    atorvastatin (LIPITOR) 40 MG tablet Take 40 mg by mouth once daily.    budesonide-formoterol 160-4.5 mcg (SYMBICORT) 160-4.5 mcg/actuation HFAA Inhale 2 puffs into the lungs every 12 (twelve) hours.    FLUoxetine 40 MG capsule Take 40 mg by mouth once daily.    gabapentin (NEURONTIN) 600 MG tablet Take 600 mg by mouth 3 (three) times daily.    hydrOXYzine HCL (ATARAX) 25 MG tablet Take 25 mg by mouth once daily.    loratadine (CLARITIN) 10 mg tablet Take 10 mg by mouth once daily.    losartan (COZAAR) 50 MG tablet Take 50 mg by mouth once daily.    meloxicam (MOBIC) 7.5 MG tablet Take 7.5 mg by mouth once daily.    mirtazapine (REMERON) 15 MG tablet Take 15 mg by mouth every evening.    omeprazole (PRILOSEC) 40 MG capsule Take 40 mg by mouth once daily.    [DISCONTINUED] lisinopril-hydrochlorothiazide (PRINZIDE,ZESTORETIC) 20-12.5 mg per tablet Take 1 tablet by mouth once daily.     Family History    None       Tobacco Use    Smoking status: Former    Smokeless tobacco: Never   Substance and Sexual Activity    Alcohol use: No    Drug use: No    Sexual activity: Not on file     Review of Systems   Constitutional:  Positive for activity change, diaphoresis and fatigue. Negative for appetite change, chills, fever and unexpected weight change.   HENT:  Negative for congestion, sore throat and trouble swallowing.    Respiratory:  Positive for chest tightness. Negative for cough, shortness of breath and wheezing.    Cardiovascular:  Positive for chest pain. Negative for  palpitations and leg swelling.   Gastrointestinal:  Positive for nausea. Negative for abdominal distention, abdominal pain, constipation, diarrhea and vomiting.   Genitourinary:  Negative for decreased urine volume, difficulty urinating, dysuria, flank pain, frequency, hematuria and urgency.   Musculoskeletal:  Positive for arthralgias (R shoulder). Negative for back pain.   Skin: Negative.    Neurological:  Negative for dizziness, syncope, weakness, light-headedness and headaches.   Objective:     Vital Signs (Most Recent):  Temp: 98 °F (36.7 °C) (07/12/23 1845)  Pulse: 68 (07/12/23 2234)  Resp: 18 (07/12/23 1845)  BP: (!) 146/89 (07/12/23 2234)  SpO2: 99 % (07/12/23 1845) Vital Signs (24h Range):  Temp:  [98 °F (36.7 °C)] 98 °F (36.7 °C)  Pulse:  [68-91] 68  Resp:  [18] 18  SpO2:  [99 %] 99 %  BP: (120-146)/(82-89) 146/89     Weight: 85.7 kg (189 lb)  Body mass index is 32.44 kg/m².     Physical Exam  Vitals and nursing note reviewed.   Constitutional:       General: She is not in acute distress.     Appearance: Normal appearance. She is well-developed and normal weight.   HENT:      Head: Normocephalic and atraumatic.      Mouth/Throat:      Dentition: Normal dentition.   Eyes:      General: Lids are normal.      Extraocular Movements: Extraocular movements intact.      Conjunctiva/sclera: Conjunctivae normal.   Cardiovascular:      Rate and Rhythm: Normal rate and regular rhythm.      Heart sounds: Normal heart sounds.   Pulmonary:      Effort: Pulmonary effort is normal.      Breath sounds: Normal breath sounds.   Chest:      Chest wall: No tenderness.   Abdominal:      General: Bowel sounds are normal.      Palpations: Abdomen is soft.   Musculoskeletal:      Cervical back: Neck supple.      Right lower leg: No edema.      Left lower leg: No edema.   Skin:     General: Skin is warm and dry.      Findings: No erythema or rash.   Neurological:      Mental Status: She is alert and oriented to person, place, and  time.           Significant Labs: All pertinent labs within the past 24 hours have been reviewed.  CBC:   Recent Labs   Lab 07/12/23 1915 07/12/23  2223   WBC 14.20* 15.92*   HGB 12.4 12.9   HCT 38.3 40.4    312     CMP:   Recent Labs   Lab 07/12/23 1915      K 3.1*      CO2 25      BUN 22*   CREATININE 1.6*   CALCIUM 9.4   PROT 6.9   ALBUMIN 3.5   BILITOT 0.2   ALKPHOS 106   AST 17   ALT 15   ANIONGAP 12     Cardiac Markers:   Recent Labs   Lab 07/12/23 1915   BNP 15     Troponin:   Recent Labs   Lab 07/12/23 1915 07/12/23  2232   TROPONINI 2.711* 2.536*       Significant Imaging: I have reviewed all pertinent imaging results/findings within the past 24 hours.

## 2023-07-13 NOTE — ASSESSMENT & PLAN NOTE
-in setting of recent illness, ABX and steroid use, and home medications  -no recent priors for comparison  -continue IVF hydration overnight  -hold home losartan  -continue home gabapentin for now >> dose reduce if indicated  -hold home meloxicam   -trend labs

## 2023-07-13 NOTE — H&P
[facility] Medicine  History & Physical    Patient Name: Naomy Licona  MRN: [id number]  Patient Class: IP- Inpatient  Admission Date: 7/12/2023  Attending Physician: Yvan Mckeon MD   Primary Care Provider: Guanakito Valdez MD    Patient information was obtained from patient, past medical records and ER records.     Subjective:     Principal Problem:NSTEMI (non-ST elevated myocardial infarction)    Chief Complaint:   Chief Complaint   Patient presents with    chest heaviness     Reports right side CP that started yesterday. Endorses indigestion yesterday, took Tums with no relief. Denies SOB        HPI: Ms. Licona is a 58 YOF with PMHx of HTN, HLD, anxiety/depression, and asthma. She presents to ED with complaints of burning mid-sternal chest and epigastric pain that began last night with radiation to R shoulder with associated nausea and diaphoresis. She took Tums last night without relief in pain. She notes that pain returned today prompting her ED visit.     She reports that she has been feeling fatigued with malaise for last few weeks and was seen two times at urgent care with diagnosis of otitis media. She initially took bactrim for OM however developed rash and was given cream, alternative antibiotic (unable to recall name), and prednisone which she completed course.      She denies SOB, MAYER,  abdominal pain, V/D, constipation, urinary symptoms or hematuria, decreases UOP, changes in bowel habits or blood in stool, decreased appetite, changes in PO intake, light headiness, dizziness, seizures, or syncope. She lives with family, uses no ambulatory aides, is independent in ADLs. Denies alcohol, tobacco, or illicit drug use. She has family history of CAD/MI in mother, father, and sister.     In the ED she is HDS and afebrile. CBC with WBC 16, H/H stable. Chemistry with K 3.1 (repleted), BUN 22, SCr 1.6. LFTs WNL. BNP 15. Troponin 2.711 >> 2.536. CXR without acute cardiopulmonary  process. EKG with NSR and nonspecific ST an T wave changes (no prior for comparison). She was given ASA, initiated on heparin gtt, given IVFs, and Cardiology consulted. The patient was admitted to the Hospital Medicine Service for further evaluation and management.       Past Medical History:   Diagnosis Date    Asthma     Hypertension        Past Surgical History:   Procedure Laterality Date    CHOLECYSTECTOMY      TUBAL LIGATION         Review of patient's allergies indicates:  No Known Allergies    No current facility-administered medications on file prior to encounter.     Current Outpatient Medications on File Prior to Encounter   Medication Sig    amLODIPine (NORVASC) 10 MG tablet Take 10 mg by mouth once daily.    atorvastatin (LIPITOR) 40 MG tablet Take 40 mg by mouth once daily.    budesonide-formoterol 160-4.5 mcg (SYMBICORT) 160-4.5 mcg/actuation HFAA Inhale 2 puffs into the lungs every 12 (twelve) hours.    FLUoxetine 40 MG capsule Take 40 mg by mouth once daily.    gabapentin (NEURONTIN) 600 MG tablet Take 600 mg by mouth 3 (three) times daily.    hydrOXYzine HCL (ATARAX) 25 MG tablet Take 25 mg by mouth once daily.    loratadine (CLARITIN) 10 mg tablet Take 10 mg by mouth once daily.    losartan (COZAAR) 50 MG tablet Take 50 mg by mouth once daily.    meloxicam (MOBIC) 7.5 MG tablet Take 7.5 mg by mouth once daily.    mirtazapine (REMERON) 15 MG tablet Take 15 mg by mouth every evening.    omeprazole (PRILOSEC) 40 MG capsule Take 40 mg by mouth once daily.    [DISCONTINUED] lisinopril-hydrochlorothiazide (PRINZIDE,ZESTORETIC) 20-12.5 mg per tablet Take 1 tablet by mouth once daily.     Family History    None       Tobacco Use    Smoking status: Former    Smokeless tobacco: Never   Substance and Sexual Activity    Alcohol use: No    Drug use: No    Sexual activity: Not on file     Review of Systems   Constitutional:  Positive for activity change, diaphoresis and fatigue. Negative  for appetite change, chills, fever and unexpected weight change.   HENT:  Negative for congestion, sore throat and trouble swallowing.    Respiratory:  Positive for chest tightness. Negative for cough, shortness of breath and wheezing.    Cardiovascular:  Positive for chest pain. Negative for palpitations and leg swelling.   Gastrointestinal:  Positive for nausea. Negative for abdominal distention, abdominal pain, constipation, diarrhea and vomiting.   Genitourinary:  Negative for decreased urine volume, difficulty urinating, dysuria, flank pain, frequency, hematuria and urgency.   Musculoskeletal:  Positive for arthralgias (R shoulder). Negative for back pain.   Skin: Negative.    Neurological:  Negative for dizziness, syncope, weakness, light-headedness and headaches.   Objective:     Vital Signs (Most Recent):  Temp: 98 °F (36.7 °C) (07/12/23 1845)  Pulse: 68 (07/12/23 2234)  Resp: 18 (07/12/23 1845)  BP: (!) 146/89 (07/12/23 2234)  SpO2: 99 % (07/12/23 1845) Vital Signs (24h Range):  Temp:  [98 °F (36.7 °C)] 98 °F (36.7 °C)  Pulse:  [68-91] 68  Resp:  [18] 18  SpO2:  [99 %] 99 %  BP: (120-146)/(82-89) 146/89     Weight: 85.7 kg (189 lb)  Body mass index is 32.44 kg/m².     Physical Exam  Vitals and nursing note reviewed.   Constitutional:       General: She is not in acute distress.     Appearance: Normal appearance. She is well-developed and normal weight.   HENT:      Head: Normocephalic and atraumatic.      Mouth/Throat:      Dentition: Normal dentition.   Eyes:      General: Lids are normal.      Extraocular Movements: Extraocular movements intact.      Conjunctiva/sclera: Conjunctivae normal.   Cardiovascular:      Rate and Rhythm: Normal rate and regular rhythm.      Heart sounds: Normal heart sounds.   Pulmonary:      Effort: Pulmonary effort is normal.      Breath sounds: Normal breath sounds.   Chest:      Chest wall: No tenderness.   Abdominal:      General: Bowel sounds are normal.      Palpations:  Abdomen is soft.   Musculoskeletal:      Cervical back: Neck supple.      Right lower leg: No edema.      Left lower leg: No edema.   Skin:     General: Skin is warm and dry.      Findings: No erythema or rash.   Neurological:      Mental Status: She is alert and oriented to person, place, and time.           Significant Labs: All pertinent labs within the past 24 hours have been reviewed.  CBC:   Recent Labs   Lab 07/12/23 1915 07/12/23  2223   WBC 14.20* 15.92*   HGB 12.4 12.9   HCT 38.3 40.4    312     CMP:   Recent Labs   Lab 07/12/23 1915      K 3.1*      CO2 25      BUN 22*   CREATININE 1.6*   CALCIUM 9.4   PROT 6.9   ALBUMIN 3.5   BILITOT 0.2   ALKPHOS 106   AST 17   ALT 15   ANIONGAP 12     Cardiac Markers:   Recent Labs   Lab 07/12/23 1915   BNP 15     Troponin:   Recent Labs   Lab 07/12/23 1915 07/12/23  2232   TROPONINI 2.711* 2.536*       Significant Imaging: I have reviewed all pertinent imaging results/findings within the past 24 hours.    Assessment/Plan:     * NSTEMI (non-ST elevated myocardial infarction)  Leukocytosis  HTN  HLD   -strong family history of CAD/MI with personal risk factors of HTN, HLD, obesity  -at admission HDS and afebrile. CBC with WBC 16, H/H stable. Chemistry with K 3.1 (repleted), BUN 22, SCr 1.6. LFTs WNL. BNP 15. Troponin 2.711 >> 2.536. CXR without acute cardiopulmonary process. EKG with NSR and nonspecific ST an T wave changes (no prior for comparison).   -leukocytosis likely reactionary in setting of NSTEMI and recent illness/prednisone use  -given ASA in ED >>> continue daily dosing  -on heparin gtt per protocol >>> continue   -metoprolol initiated in ED  >> monitor for toleration  -hold home losartan due to KITA >> resume when indicated   -continue home amlodipine  -continue home atorvastatin at high intensity dosing   -continue IVF hydration  -dose/medication adjustment as appropriate   -NPO at [place]  -Cardiology consulted >>> [facility] pending in  AM  -TTE ordered >> please follow   -TSH, HgA1C, lipid panel in AM  -continue tele monitoring  -EKG daily and PRN concerns/chest pain  -SL NTG PRN chest pain  -PRN supportive care needed  -continue lab trending  -monitor     KITA (acute kidney injury)  -in setting of recent illness, ABX and steroid use, and home medications  -no recent priors for comparison  -continue IVF hydration overnight  -hold home losartan  -continue home gabapentin for now >> dose reduce if indicated  -hold home meloxicam   -trend labs     Asthma  -chronic  -controlled  -continue home symbicort  -monitor     Chronic neck pain  -chronic  -continue home gabapentin >>> dose reduce for renal function if indicated  -hold home meloxicam   -PRN supportive care as indicated  -monitor     Anxiety and depression  -chronic  -controlled  -follows with Psychiatry  -continue home prozac, mirtazipine, and hydroxyzine  -monitor       VTE Risk Mitigation (From admission, onward)         Ordered     Reason for No Pharmacological VTE Prophylaxis  Once        Question:  Reasons:  Answer:  IV Heparin w/in 24 hrs. Pre or Post-Op    07/12/23 2314     IP VTE HIGH RISK PATIENT  Once         07/12/23 2314     Place sequential compression device  Until discontinued         07/12/23 2314     heparin 25,000 units in dextrose 5% (100 units/ml) IV bolus from bag - ADDITIONAL PRN BOLUS - 60 units/kg (max bolus 4000 units)  As needed (PRN)        Question:  Heparin Infusion Adjustment (DO NOT MODIFY ANSWER)  Answer:  \\[facility]\epic\Images\Pharmacy\HeparinInfusions\heparin LOW INTENSITY nomogram for [facility] [id number].pdf    07/12/23 2124     heparin 25,000 units in dextrose 5% (100 units/ml) IV bolus from bag - ADDITIONAL PRN BOLUS - 30 units/kg (max bolus 4000 units)  As needed (PRN)        Question:  Heparin Infusion Adjustment (DO NOT MODIFY ANSWER)  Answer:  \\[facility]\epic\Images\Pharmacy\HeparinInfusions\heparin LOW INTENSITY nomogram for [facility] [id number].pdf    07/12/23 2124      heparin 25,000 units in dextrose 5% 250 mL (100 units/mL) infusion LOW INTENSITY nomogram - [facility]  Continuous        Question Answer Comment   Heparin Infusion Adjustment (DO NOT MODIFY ANSWER) \\[email or web address]\epic\Images\Pharmacy\HeparinInfusions\heparin LOW INTENSITY nomogram for [facility] [id number].[email or web address]    Begin at (in units/kg/hr) 12 07/12/23 2125                Shahla Ramirez, JONATHON, AG-ACNP, BC  Department of Hospital Medicine  [facility]

## 2023-07-13 NOTE — ED NOTES
Pt lying in bed, respirations even, unlabored, NAD noted, answering questions appropriately. Pt updated on plan of care, placed on monitoring. Callbell within reach. Will continue to monitor

## 2023-07-13 NOTE — PROGRESS NOTES
Pt remain stable and comfortable, TR Band aseptically removed per MD prder, no bleeding / hematoma, Pacu discharge criteria met

## 2023-07-13 NOTE — DISCHARGE INSTRUCTIONS
Take all medications as prescribed.  Eat a strict low salt cardiac and diabetic diet.  Follow up with your physicians as scheduled - pcp within 1 week.  Thank you for trusting [facility] and Dr. Mckeon with your care.  We are honored that you entrusted us with your healthcare needs. Your satisfaction is very important to us and we hope you have been very pleased with your experience at [facility]. After your discharge you may receive a survey asking you to rate your hospital experience. We encourage you to take the time to complete the survey as your feedback allows us to identify areas for improvement as well as recognize our staff.   We hope that you have received the very best care possible during your hospitalization at [facility], as your satisfaction is our top priority.

## 2023-07-13 NOTE — ASSESSMENT & PLAN NOTE
-chronic  -controlled  -follows with Psychiatry  -continue home prozac, mirtazipine, and hydroxyzine  -monitor

## 2023-07-13 NOTE — INTERVAL H&P NOTE
The patient has been examined and the H&P has been reviewed:    I concur with the findings and no changes have occurred since H&P was written.    Anesthesia/Surgery risks, benefits and alternative options discussed and understood by patient/family.          Active Hospital Problems    Diagnosis  POA    *NSTEMI (non-ST elevated myocardial infarction) [I21.4]  Unknown    Anxiety and depression [F41.9, F32.A]  Unknown    Chronic neck pain [M54.2, G89.29]  Unknown    Primary hypertension [I10]  Unknown    Hyperlipidemia [E78.5]  Unknown    Asthma [J45.909]  Unknown    Leukocytosis [D72.829]  Unknown    KITA (acute kidney injury) [N17.9]  Unknown      Resolved Hospital Problems   No resolved problems to display.

## 2023-07-13 NOTE — ED NOTES
Pt arrives to Ed with c/o chest pain, burning with onset lastnight. Pt reports feeling nauseated lastnight, took tums with no relief of symptoms. Pt reports pain continued today. Pt reports feeling fatigued. Pt lying in bed, respirations even, unlabored, NAD noted, answering questions appropriately.

## 2023-07-13 NOTE — ED PROVIDER NOTES
Encounter Date: 7/12/2023       History     Chief Complaint   Patient presents with    chest heaviness     Reports right side CP that started yesterday. Endorses indigestion yesterday, took Tums with no relief. Denies SOB     Afebrile 58-year-old female with PMH of asthma, hypertension reported hyperlipidemia presents the ED for evaluation of epigastric and midsternal chest pain.  Patient reports for the past few weeks she is had fatigue and generalized malaise which she attributed to otitis media.  She states last night while resting she began with some indigestion.  She took Tums however she had no relief.  States that she would an episode at that time where she had nausea and diaphoresis.  States it lasted few minutes and resolved on own volition.  She states she came for evaluation today as upon wakening she continued with some indigestion however denies any recurrent episodes of diaphoresis, shortness of breath, nausea, vomiting.  She does continue with some right shoulder heaviness.  Denies any numbness, tingling or additional complaints.  She is tried no medications today for the symptoms.    The history is provided by the patient.   Review of patient's allergies indicates:  No Known Allergies  Past Medical History:   Diagnosis Date    Asthma     Hypertension      Past Surgical History:   Procedure Laterality Date    CHOLECYSTECTOMY      TUBAL LIGATION       No family history on file.  Social History     Tobacco Use    Smoking status: Former    Smokeless tobacco: Never   Substance Use Topics    Alcohol use: No    Drug use: No     Review of Systems  See HPI  Physical Exam     Initial Vitals [07/12/23 1845]   BP Pulse Resp Temp SpO2   120/82 91 18 98 °F (36.7 °C) 99 %      MAP       --         Physical Exam    Nursing note and vitals reviewed.  Constitutional: Vital signs are normal. She appears well-developed and well-nourished. She is cooperative.  Non-toxic appearance. She does not appear ill. No distress.    HENT:   Head: Normocephalic and atraumatic.   Eyes: Conjunctivae and lids are normal.   Neck: Neck supple.   Cardiovascular:  Normal rate and regular rhythm.           Pulmonary/Chest: Breath sounds normal. No respiratory distress. She has no wheezes. She has no rhonchi.   Abdominal: Abdomen is soft. Bowel sounds are normal. There is no abdominal tenderness. There is no rigidity and no guarding.   Musculoskeletal:         General: Normal range of motion.      Cervical back: Neck supple. No rigidity.     Neurological: She is alert and oriented to person, place, and time. She has normal strength. GCS score is 15. GCS eye subscore is 4. GCS verbal subscore is 5. GCS motor subscore is 6.   Skin: Skin is warm, dry and intact. No rash noted.   Psychiatric: She has a normal mood and affect. Her speech is normal and behavior is normal. Thought content normal.       ED Course   Procedures  Labs Reviewed   CBC W/ AUTO DIFFERENTIAL - Abnormal; Notable for the following components:       Result Value    WBC 14.20 (*)     RDW 15.6 (*)     MPV 8.9 (*)     Immature Granulocytes 0.8 (*)     Immature Grans (Abs) 0.11 (*)     Lymph # 6.4 (*)     All other components within normal limits   COMPREHENSIVE METABOLIC PANEL - Abnormal; Notable for the following components:    Potassium 3.1 (*)     BUN 22 (*)     Creatinine 1.6 (*)     eGFR 37 (*)     All other components within normal limits   TROPONIN I - Abnormal; Notable for the following components:    Troponin I 2.711 (*)     All other components within normal limits   B-TYPE NATRIURETIC PEPTIDE   TROPONIN I   APTT   PROTIME-INR   CBC W/ AUTO DIFFERENTIAL          Imaging Results              X-Ray Chest AP Portable (Final result)  Result time 07/12/23 20:36:24   Procedure changed from X-Ray Chest PA And Lateral     Final result by Luanne Talbert MD (07/12/23 20:36:24)                   Impression:      No acute cardiopulmonary process identified.      Electronically signed  by: Luanne Talbert MD  Date:    07/12/2023  Time:    20:36               Narrative:    EXAMINATION:  XR CHEST AP PORTABLE    CLINICAL HISTORY:  Chest Pain;    TECHNIQUE:  Single frontal view of the chest was performed.    COMPARISON:  March 2015.    FINDINGS:  Cardiac silhouette is normal in size.  Lungs are symmetrically expanded.  No evidence of focal consolidative process, pneumothorax, or significant pleural effusion.  No acute osseous abnormality identified.                                       Medications   sodium chloride 0.9% bolus 1,000 mL 1,000 mL (1,000 mLs Intravenous New Bag 7/12/23 2057)   heparin 25,000 units in dextrose 5% (100 units/ml) IV bolus from bag INITIAL BOLUS (max bolus 4000 units) (has no administration in time range)   heparin 25,000 units in dextrose 5% 250 mL (100 units/mL) infusion LOW INTENSITY nomogram - [facility] (has no administration in time range)   heparin 25,000 units in dextrose 5% (100 units/ml) IV bolus from bag - ADDITIONAL PRN BOLUS - 60 units/kg (max bolus 4000 units) (has no administration in time range)   heparin 25,000 units in dextrose 5% (100 units/ml) IV bolus from bag - ADDITIONAL PRN BOLUS - 30 units/kg (max bolus 4000 units) (has no administration in time range)   aspirin EC tablet 325 mg (325 mg Oral Given 7/12/23 2111)     Medical Decision Making:   History:   Old Medical Records: I decided to obtain old medical records.  Old Records Summarized: other records.  Initial Assessment:   Emergent evaluation of 58-year-old female presenting with chest pain.  Symptoms began last night.  She appears comfortable in no distress.  Exam grossly unremarkable this time  Differential Diagnosis:   Differential Diagnosis includes, but is not limited to:  ACS/MI, PE, aortic dissection, pneumothorax, cardiac tamponade, pericarditis/myocarditis, pneumonia, infection/abscess, lung mass, trauma/fracture, costochondritis/pleurisy, MSK pain/contusion, GERD, biliary disease, pancreatitis,  anemia    Clinical Tests:   Lab Tests: Reviewed  Radiological Study: Reviewed and Ordered  Medical Tests: Reviewed and Ordered  ED Management:  EKG reveals some ST wave changes.  No STEMI.  Labs placed from tele triage process.  Initial labs notable for leukocytosis however no left shift.  Maybe related to steroids and mild dehydration.  Will continue to monitor.  Creatinine noted to be elevated at 1.6.  No recent to compare to hence will give IV fluids and maybe related to some dehydration once again reflective in this, potassium and leukocytosis.  Noting patient is on HCTZ.  BNP within normal limits.  Chest x-ray with no focal consolidation.  Troponin did result in his noted to be elevated at 2.711.  Aspirin ordered. Discussed with cardiology who recommends heparin infusion.  Will keep NPO at midnight.  Patient will go to angiogram in the morning.  Serial troponin is ordered.  Discussed with Hospital Medicine and he is agreeable to plan.  Discussed with patient she is agreeable.  Stable at time of admission.  Additional MDM:   Heart Score:    History:          Moderately suspicious.  ECG:             Nonspecific repolarisation disturbance  Age:               45-65 years  Risk factors: 1-2 risk factors  Troponin:       >2x normal limit  Final Score: 6                       Clinical Impression:   Final diagnoses:  [R07.9] Chest pain  [I21.4] NSTEMI (non-ST elevated myocardial infarction)        ED Disposition Condition    Admit                 MAXIMILIAN Leiva  07/12/23 2149       MAXIMILIAN Leiva  07/12/23 2149

## 2023-07-13 NOTE — ASSESSMENT & PLAN NOTE
-chronic  -continue home gabapentin >>> dose reduce for renal function if indicated  -hold home meloxicam   -PRN supportive care as indicated  -monitor

## 2023-07-13 NOTE — CONSULTS
Cardiology Consult  7/13/2023  8:09 AM    Attending Cardiologist: Jordan Uriostegui M.D.  Primary Care Provider: Guanakito Valdez MD  Chief Complaint/Reason For Consultation:  NSTEMI      Problem list  Patient Active Problem List   Diagnosis    NSTEMI (non-ST elevated myocardial infarction)    Primary hypertension    Hyperlipidemia    Asthma    Leukocytosis    KITA (acute kidney injury)    Anxiety and depression    Chronic neck pain       CC:  CP    HPI:  Naomy Licona is a 58 y.o.year-old female with PMH of HTN, asthma, HLP, ex-smoker (quit 10 years ago) who presented to the emergency department last night with episode of chest pain with right arm numbness.  Two nights ago, she started having sharp chest pain which radiated to her R arm associated with diaphoresis.  Patient thought it was indigestion and tried Tums without any relief.  The pain was intermittent on it long.  She woke up yesterday feeling fatigue.  She decided to come to the emergency room because of the persistent right arm pain.  She was treated in the emergency room with aspirin, IV heparin.  Her troponins were 2.7, 2.5 and 1.6.  Currently she is asymptomatic.  Her EKG shows sinus rhythm with nonspecific ST changes.  She denies any prior cardiac problems.  She denies any history of bleeding.  She denies any TIA or CVA.    Medications  Current Facility-Administered Medications   Medication Dose Route Frequency Provider Last Rate Last Admin    0.9%  NaCl infusion   Intravenous Continuous Shahla Ramirez NP   Stopped at 07/13/23 0735    acetaminophen tablet 650 mg  650 mg Oral Q4H PRN Shahla Ramirez NP        amLODIPine tablet 10 mg  10 mg Oral Daily Shahla Ramirez NP        aspirin chewable tablet 81 mg  81 mg Oral Daily Jordan Uriostegui MD        atorvastatin tablet 80 mg  80 mg Oral Daily Jordan Uriostegui MD   80 mg at 07/12/23 2234    bisacodyL suppository 10 mg  10 mg Rectal Daily PRN Shahla Ramirez NP         cetirizine tablet 10 mg  10 mg Oral Daily Shahla Ramirez NP        diazePAM tablet 5 mg  5 mg Oral On Call Procedure Jordan Uriostegui MD        FLUoxetine capsule 40 mg  40 mg Oral Daily Shahla Ramirez NP        fluticasone furoate-vilanteroL 100-25 mcg/dose diskus inhaler 1 puff  1 puff Inhalation Daily Shahla Ramirez NP   1 puff at 07/13/23 0800    gabapentin capsule 600 mg  600 mg Oral TID Shahla Ramirez NP        heparin 25,000 units in dextrose 5% (100 units/ml) IV bolus from bag - ADDITIONAL PRN BOLUS - 30 units/kg (max bolus 4000 units)  30 Units/kg (Adjusted) Intravenous PRN MAXIMILIAN Leiva        heparin 25,000 units in dextrose 5% (100 units/ml) IV bolus from bag - ADDITIONAL PRN BOLUS - 60 units/kg (max bolus 4000 units)  59.6 Units/kg (Adjusted) Intravenous PRN MAXIMILIAN Leiva        heparin 25,000 units in dextrose 5% 250 mL (100 units/mL) infusion LOW INTENSITY nomogram - [facility]  0-40 Units/kg/hr (Adjusted) Intravenous Continuous MAXIMILIAN Leiva   Stopped at 07/13/23 0735    HYDROcodone-acetaminophen 5-325 mg per tablet 1 tablet  1 tablet Oral Q4H PRN Shahla Ramirez NP        hydrOXYzine HCL tablet 25 mg  25 mg Oral Daily Shahla Ramirez NP        melatonin tablet 6 mg  6 mg Oral Nightly PRN Shahla Ramirez NP        metoprolol succinate (TOPROL-XL) 24 hr tablet 25 mg  25 mg Oral Daily Jordan Uriostegui MD   25 mg at 07/12/23 2234    mirtazapine tablet 15 mg  15 mg Oral QHS Shahla Ramirez NP   15 mg at 07/13/23 0037    nitroGLYCERIN SL tablet 0.4 mg  0.4 mg Sublingual Q5 Min PRN Shahla Ramirez NP        ondansetron disintegrating tablet 8 mg  8 mg Oral Q8H PRN Shahla Ramirez NP        ondansetron injection 4 mg  4 mg Intravenous Q8H PRN Shahla Ramirez, AMY        pantoprazole EC tablet 40 mg  40 mg Oral Daily Shahla Ramirez, AMY        polyethylene glycol packet 17 g  17 g Oral BID PRN Shahla Ramirez, AMY        senna-docusate  8.6-50 mg per tablet 1 tablet  1 tablet Oral BID Shahla Ramirez, NP        sodium chloride 0.9% flush 10 mL  10 mL Intravenous PRN Shahla Ramirez NP         Current Outpatient Medications   Medication Sig Dispense Refill    amLODIPine (NORVASC) 10 MG tablet Take 10 mg by mouth once daily.      atorvastatin (LIPITOR) 40 MG tablet Take 40 mg by mouth once daily.      budesonide-formoterol 160-4.5 mcg (SYMBICORT) 160-4.5 mcg/actuation HFAA Inhale 2 puffs into the lungs every 12 (twelve) hours.      FLUoxetine 40 MG capsule Take 40 mg by mouth once daily.      gabapentin (NEURONTIN) 600 MG tablet Take 600 mg by mouth 3 (three) times daily.      hydrOXYzine HCL (ATARAX) 25 MG tablet Take 25 mg by mouth once daily.      loratadine (CLARITIN) 10 mg tablet Take 10 mg by mouth once daily.      losartan (COZAAR) 50 MG tablet Take 50 mg by mouth once daily.      meloxicam (MOBIC) 7.5 MG tablet Take 7.5 mg by mouth once daily.      mirtazapine (REMERON) 15 MG tablet Take 15 mg by mouth every evening.      omeprazole (PRILOSEC) 40 MG capsule Take 40 mg by mouth once daily.        Prior to Admission medications    Medication Sig Start Date End Date Taking? Authorizing Provider   amLODIPine (NORVASC) 10 MG tablet Take 10 mg by mouth once daily.    Historical Provider   atorvastatin (LIPITOR) 40 MG tablet Take 40 mg by mouth once daily.    Historical Provider   budesonide-formoterol 160-4.5 mcg (SYMBICORT) 160-4.5 mcg/actuation HFAA Inhale 2 puffs into the lungs every 12 (twelve) hours.    Historical Provider   FLUoxetine 40 MG capsule Take 40 mg by mouth once daily.    Historical Provider   gabapentin (NEURONTIN) 600 MG tablet Take 600 mg by mouth 3 (three) times daily.    Historical Provider   hydrOXYzine HCL (ATARAX) 25 MG tablet Take 25 mg by mouth once daily.    Historical Provider   loratadine (CLARITIN) 10 mg tablet Take 10 mg by mouth once daily.    Historical Provider   losartan (COZAAR) 50 MG tablet Take 50 mg by  mouth once daily.    Historical Provider   meloxicam (MOBIC) 7.5 MG tablet Take 7.5 mg by mouth once daily.    Historical Provider   mirtazapine (REMERON) 15 MG tablet Take 15 mg by mouth every evening.    Historical Provider   omeprazole (PRILOSEC) 40 MG capsule Take 40 mg by mouth once daily.    Historical Provider         History  Past Medical History:   Diagnosis Date    Asthma     Hypertension      Past Surgical History:   Procedure Laterality Date    CHOLECYSTECTOMY      TUBAL LIGATION       Social History     Socioeconomic History    Marital status:    Tobacco Use    Smoking status: Former    Smokeless tobacco: Never   Substance and Sexual Activity    Alcohol use: No    Drug use: No         Allergies  Review of patient's allergies indicates:  No Known Allergies      Review of Systems   Review of Systems   Constitutional: Negative for decreased appetite, fever and weight loss.   HENT:  Negative for congestion and nosebleeds.    Eyes:  Negative for double vision, vision loss in left eye, vision loss in right eye and visual disturbance.   Cardiovascular:  Positive for chest pain. Negative for claudication, cyanosis, dyspnea on exertion, irregular heartbeat, leg swelling, near-syncope, orthopnea, palpitations, paroxysmal nocturnal dyspnea and syncope.   Respiratory:  Negative for cough, hemoptysis, shortness of breath, sleep disturbances due to breathing, snoring, sputum production and wheezing.    Endocrine: Negative for cold intolerance and heat intolerance.   Skin:  Negative for nail changes and rash.   Musculoskeletal:  Negative for joint pain, muscle cramps, muscle weakness and myalgias.   Gastrointestinal:  Negative for change in bowel habit, heartburn, hematemesis, hematochezia, hemorrhoids and melena.   Neurological:  Negative for dizziness, focal weakness and headaches.       Physical Exam  Wt Readings from Last 1 Encounters:   07/12/23 85.7 kg (189 lb)     BP Readings from Last 3 Encounters:    07/13/23 130/79   03/08/15 126/70     Pulse Readings from Last 1 Encounters:   07/13/23 78     Body mass index is 32.44 kg/m².    Physical Exam  Constitutional:       Appearance: She is well-developed.   HENT:      Head: Atraumatic.   Eyes:      General: No scleral icterus.  Neck:      Vascular: Normal carotid pulses. No carotid bruit, hepatojugular reflux or JVD.   Cardiovascular:      Rate and Rhythm: Normal rate and regular rhythm.      Chest Wall: PMI is not displaced.      Pulses: Intact distal pulses.           Carotid pulses are 2+ on the right side and 2+ on the left side.       Radial pulses are 2+ on the right side and 2+ on the left side.        Dorsalis pedis pulses are 2+ on the right side and 2+ on the left side.      Heart sounds: Normal heart sounds, S1 normal and S2 normal. No murmur heard.    No friction rub.   Pulmonary:      Effort: Pulmonary effort is normal. No respiratory distress.      Breath sounds: Normal breath sounds. No stridor. No wheezing or rales.   Chest:      Chest wall: No tenderness.   Abdominal:      General: Bowel sounds are normal.      Palpations: Abdomen is soft.   Musculoskeletal:      Cervical back: Neck supple. No edema.   Skin:     General: Skin is warm and dry.      Nails: There is no clubbing.   Neurological:      Mental Status: She is alert and oriented to person, place, and time.   Psychiatric:         Behavior: Behavior normal.         Thought Content: Thought content normal.         Laboratory:  Trended Lab Data:  Recent Labs   Lab 07/12/23 1915 07/12/23 2223 07/13/23 0459   WBC 14.20* 15.92* 14.90*   HGB 12.4 12.9 11.7*   HCT 38.3 40.4 36.5*    312 318       Recent Labs   Lab 07/12/23 1915 07/13/23 0459    140   K 3.1* 4.0    105   CO2 25 27   BUN 22* 21*    95   CALCIUM 9.4 9.1   MG  --  2.2       Recent Labs   Lab 07/12/23 1915 07/13/23 0459   PROT 6.9 6.4   ALBUMIN 3.5 3.3*   BILITOT 0.2 0.4   AST 17 14   ALT 15 13   ALKPHOS  106 111       Recent Labs   Lab 07/12/23 2223   INR 0.9       Cardiac:   Recent Labs   Lab 07/12/23  1915 07/12/23  2232 07/13/23  0459   TROPONINI 2.711* 2.536* 1.611*   BNP 15  --   --        FLP: No results found for: CHOL, HDL, LDLCALC, TRIG, CHOLHDL  DM:   Lab Results   Component Value Date    CREATININE 1.1 07/13/2023     Thyroid:   Lab Results   Component Value Date    TSH 1.411 07/13/2023     Anemia: No results found for: IRON, TIBC, FERRITIN, [id number], FOLATE  Urinalysis: No results found for: LABURIN, COLORU, CLARITYU, SPECGRAV, LABSPEC, NITRITE, PROTEINUR, GLUCOSEU, KETONESU, UROBILINOGEN, BILIRUBINUR, BLOODU  @    Other Results:  EKG (my interpretation):    TELEMETRY:  sinus    Echo: No results found for this or any previous visit.    Radiology:  X-Ray Chest AP Portable    Result Date: 7/12/2023  EXAMINATION: XR CHEST AP PORTABLE CLINICAL HISTORY: Chest Pain; TECHNIQUE: Single frontal view of the chest was performed. COMPARISON: March 2015. FINDINGS: Cardiac silhouette is normal in size.  Lungs are symmetrically expanded.  No evidence of focal consolidative process, pneumothorax, or significant pleural effusion.  No acute osseous abnormality identified.     No acute cardiopulmonary process identified. Electronically signed by: Luanne Talbert MD Date:    07/12/2023 Time:    20:36        Current Medications:     Infusions:   sodium chloride 0.9% Stopped (07/13/23 0735)    heparin (porcine) in D5W Stopped (07/13/23 0735)        Scheduled:   amLODIPine  10 mg Oral Daily    aspirin  81 mg Oral Daily    atorvastatin  80 mg Oral Daily    cetirizine  10 mg Oral Daily    FLUoxetine  40 mg Oral Daily    fluticasone furoate-vilanteroL  1 puff Inhalation Daily    gabapentin  600 mg Oral TID    hydrOXYzine HCL  25 mg Oral Daily    metoprolol succinate  25 mg Oral Daily    mirtazapine  15 mg Oral QHS    pantoprazole  40 mg Oral Daily    senna-docusate 8.6-50 mg  1 tablet Oral BID        PRN:  acetaminophen,  bisacodyL, diazePAM, heparin (PORCINE), heparin (PORCINE), HYDROcodone-acetaminophen, melatonin, nitroGLYCERIN, ondansetron, ondansetron, polyethylene glycol, sodium chloride 0.9%      Assessment and Plan:  ACS/NSTEMI, peaked troponin of 2.7 and down to 1.6.  -treated with ASA, IV heparin, high-intensity statin  -discussed her diagnosis of NSTEMI.  The risks, benefits, indications and alternatives of the planned procedure were discussed in details.  Discussed with patient the risks and benefits of the procedure including, but not limited to, the following; 1:1000 risk of heart attack, stroke and death with 3-5% risk of bleeding, vessel damage (in the arms, legs or in the heart), and the need for emergent surgery, including open heart surgery.  All questions were answered.  The patient agreed to proceed.     HTN  -continue home meds, monitor and adjust    HLP  -atorvastatin 80 mg daily    KITA, improved Cr to 1.1 today from 1.6      Thank you for allowing me to participate in the care of Naomy Licona.      Jordan Uriostegui MD, F.A.C.C, F.S.C.A.I.    Disclaimer: This document was created using voice recognition software (M*Modal Fluency Direct). Although it may be edited, this document may contain errors related to incorrect recognition of the spoken word. Please call the physician if clarification is needed.

## 2023-07-13 NOTE — TELEPHONE ENCOUNTER
Left message for patient to give a callback.      ----- Message from Breanne Lauren sent at 7/13/2023  1:14 PM CDT -----  Regarding: hospital follow up  Name of caller: Naomy       What is the requesting detail: pt is requesting a call back to schedule a hospital follow up please advise       Can the clinic reply by [facility]:       What number to call back:[phone]

## 2023-07-13 NOTE — NURSING TRANSFER
Nursing Transfer Note      7/13/2023   10:19 AM    Nurse giving handoff:post op  Nurse receiving handoff:rn room 366    Reason patient is being transferred: post op    Transfer To: room 366    Transfer via stretcher    Transfer with     Transported by transport    Transfer Vital Signs:  Blood Pressure:99/59  Heart Rate:62  O2:  Temperature:87.7  Respirations:16    Telemetry:   Order for Tele Monitor?     Additional Lines:     4eyes on Skin:     Medicines sent: no    Any special needs or follow-up needed:     Patient belongings transferred with patient: Yes    Chart send with patient: Yes    Notified: family    Patient reassessed at:  (date, time)  1  Upon arrival to floor:

## 2023-07-13 NOTE — NURSING
Nurses Note -- 4 Eyes      7/13/2023   12:23 PM      Skin assessed during: Transfer      [x] No Altered Skin Integrity Present    [x]Prevention Measures Documented      [] Yes- Altered Skin Integrity Present or Discovered   [] LDA Added if Not in Epic (Describe Wound)   [] New Altered Skin Integrity was Present on Admit and Documented in LDA   [] Wound Image Taken    Wound Care Consulted? No    Attending Nurse:  Lianne Glynn RN     Second RN/Staff Member:  BELLE Lacy

## 2023-07-13 NOTE — H&P (VIEW-ONLY)
Cardiology Consult  7/13/2023  8:09 AM    Attending Cardiologist: Jordan Uriostegui M.D.  Primary Care Provider: Guanakito Valdez MD  Chief Complaint/Reason For Consultation:  NSTEMI      Problem list  Patient Active Problem List   Diagnosis    NSTEMI (non-ST elevated myocardial infarction)    Primary hypertension    Hyperlipidemia    Asthma    Leukocytosis    KITA (acute kidney injury)    Anxiety and depression    Chronic neck pain       CC:  CP    HPI:  Naomy Licona is a 58 y.o.year-old female with PMH of HTN, asthma, HLP, ex-smoker (quit 10 years ago) who presented to the emergency department last night with episode of chest pain with right arm numbness.  Two nights ago, she started having sharp chest pain which radiated to her R arm associated with diaphoresis.  Patient thought it was indigestion and tried Tums without any relief.  The pain was intermittent on it long.  She woke up yesterday feeling fatigue.  She decided to come to the emergency room because of the persistent right arm pain.  She was treated in the emergency room with aspirin, IV heparin.  Her troponins were 2.7, 2.5 and 1.6.  Currently she is asymptomatic.  Her EKG shows sinus rhythm with nonspecific ST changes.  She denies any prior cardiac problems.  She denies any history of bleeding.  She denies any TIA or CVA.    Medications  Current Facility-Administered Medications   Medication Dose Route Frequency Provider Last Rate Last Admin    0.9%  NaCl infusion   Intravenous Continuous Shahla Ramirez NP   Stopped at 07/13/23 0735    acetaminophen tablet 650 mg  650 mg Oral Q4H PRN Shahla Ramirez NP        amLODIPine tablet 10 mg  10 mg Oral Daily Shahla Ramirez NP        aspirin chewable tablet 81 mg  81 mg Oral Daily Jordan Uriostegui MD        atorvastatin tablet 80 mg  80 mg Oral Daily Jordan Uriostegui MD   80 mg at 07/12/23 2234    bisacodyL suppository 10 mg  10 mg Rectal Daily PRN Shahla Ramirez NP         cetirizine tablet 10 mg  10 mg Oral Daily Shahla Ramirez NP        diazePAM tablet 5 mg  5 mg Oral On Call Procedure Jordan Uriostegui MD        FLUoxetine capsule 40 mg  40 mg Oral Daily Shahla Ramirez NP        fluticasone furoate-vilanteroL 100-25 mcg/dose diskus inhaler 1 puff  1 puff Inhalation Daily Shahla Ramirez NP   1 puff at 07/13/23 0800    gabapentin capsule 600 mg  600 mg Oral TID Shahla Ramirez NP        heparin 25,000 units in dextrose 5% (100 units/ml) IV bolus from bag - ADDITIONAL PRN BOLUS - 30 units/kg (max bolus 4000 units)  30 Units/kg (Adjusted) Intravenous PRN MAXIMILIAN Leiva        heparin 25,000 units in dextrose 5% (100 units/ml) IV bolus from bag - ADDITIONAL PRN BOLUS - 60 units/kg (max bolus 4000 units)  59.6 Units/kg (Adjusted) Intravenous PRN MAXIMILIAN Leiva        heparin 25,000 units in dextrose 5% 250 mL (100 units/mL) infusion LOW INTENSITY nomogram - [facility]  0-40 Units/kg/hr (Adjusted) Intravenous Continuous MAXIMILIAN Leiva   Stopped at 07/13/23 0735    HYDROcodone-acetaminophen 5-325 mg per tablet 1 tablet  1 tablet Oral Q4H PRN Shahla Ramirez NP        hydrOXYzine HCL tablet 25 mg  25 mg Oral Daily Shahla Ramirez NP        melatonin tablet 6 mg  6 mg Oral Nightly PRN Shahla Ramirez NP        metoprolol succinate (TOPROL-XL) 24 hr tablet 25 mg  25 mg Oral Daily Jordan Uriostegui MD   25 mg at 07/12/23 2234    mirtazapine tablet 15 mg  15 mg Oral QHS Shahla Ramirez NP   15 mg at 07/13/23 0037    nitroGLYCERIN SL tablet 0.4 mg  0.4 mg Sublingual Q5 Min PRN Shahla Ramirez NP        ondansetron disintegrating tablet 8 mg  8 mg Oral Q8H PRN Shahla Ramirez NP        ondansetron injection 4 mg  4 mg Intravenous Q8H PRN Shahla Ramirez, AMY        pantoprazole EC tablet 40 mg  40 mg Oral Daily Shahla Ramirez, AMY        polyethylene glycol packet 17 g  17 g Oral BID PRN Shahla Ramirez, AMY        senna-docusate  8.6-50 mg per tablet 1 tablet  1 tablet Oral BID Shahla Ramirez, NP        sodium chloride 0.9% flush 10 mL  10 mL Intravenous PRN Shahla Ramirez NP         Current Outpatient Medications   Medication Sig Dispense Refill    amLODIPine (NORVASC) 10 MG tablet Take 10 mg by mouth once daily.      atorvastatin (LIPITOR) 40 MG tablet Take 40 mg by mouth once daily.      budesonide-formoterol 160-4.5 mcg (SYMBICORT) 160-4.5 mcg/actuation HFAA Inhale 2 puffs into the lungs every 12 (twelve) hours.      FLUoxetine 40 MG capsule Take 40 mg by mouth once daily.      gabapentin (NEURONTIN) 600 MG tablet Take 600 mg by mouth 3 (three) times daily.      hydrOXYzine HCL (ATARAX) 25 MG tablet Take 25 mg by mouth once daily.      loratadine (CLARITIN) 10 mg tablet Take 10 mg by mouth once daily.      losartan (COZAAR) 50 MG tablet Take 50 mg by mouth once daily.      meloxicam (MOBIC) 7.5 MG tablet Take 7.5 mg by mouth once daily.      mirtazapine (REMERON) 15 MG tablet Take 15 mg by mouth every evening.      omeprazole (PRILOSEC) 40 MG capsule Take 40 mg by mouth once daily.        Prior to Admission medications    Medication Sig Start Date End Date Taking? Authorizing Provider   amLODIPine (NORVASC) 10 MG tablet Take 10 mg by mouth once daily.    Historical Provider   atorvastatin (LIPITOR) 40 MG tablet Take 40 mg by mouth once daily.    Historical Provider   budesonide-formoterol 160-4.5 mcg (SYMBICORT) 160-4.5 mcg/actuation HFAA Inhale 2 puffs into the lungs every 12 (twelve) hours.    Historical Provider   FLUoxetine 40 MG capsule Take 40 mg by mouth once daily.    Historical Provider   gabapentin (NEURONTIN) 600 MG tablet Take 600 mg by mouth 3 (three) times daily.    Historical Provider   hydrOXYzine HCL (ATARAX) 25 MG tablet Take 25 mg by mouth once daily.    Historical Provider   loratadine (CLARITIN) 10 mg tablet Take 10 mg by mouth once daily.    Historical Provider   losartan (COZAAR) 50 MG tablet Take 50 mg by  mouth once daily.    Historical Provider   meloxicam (MOBIC) 7.5 MG tablet Take 7.5 mg by mouth once daily.    Historical Provider   mirtazapine (REMERON) 15 MG tablet Take 15 mg by mouth every evening.    Historical Provider   omeprazole (PRILOSEC) 40 MG capsule Take 40 mg by mouth once daily.    Historical Provider         History  Past Medical History:   Diagnosis Date    Asthma     Hypertension      Past Surgical History:   Procedure Laterality Date    CHOLECYSTECTOMY      TUBAL LIGATION       Social History     Socioeconomic History    Marital status:    Tobacco Use    Smoking status: Former    Smokeless tobacco: Never   Substance and Sexual Activity    Alcohol use: No    Drug use: No         Allergies  Review of patient's allergies indicates:  No Known Allergies      Review of Systems   Review of Systems   Constitutional: Negative for decreased appetite, fever and weight loss.   HENT:  Negative for congestion and nosebleeds.    Eyes:  Negative for double vision, vision loss in left eye, vision loss in right eye and visual disturbance.   Cardiovascular:  Positive for chest pain. Negative for claudication, cyanosis, dyspnea on exertion, irregular heartbeat, leg swelling, near-syncope, orthopnea, palpitations, paroxysmal nocturnal dyspnea and syncope.   Respiratory:  Negative for cough, hemoptysis, shortness of breath, sleep disturbances due to breathing, snoring, sputum production and wheezing.    Endocrine: Negative for cold intolerance and heat intolerance.   Skin:  Negative for nail changes and rash.   Musculoskeletal:  Negative for joint pain, muscle cramps, muscle weakness and myalgias.   Gastrointestinal:  Negative for change in bowel habit, heartburn, hematemesis, hematochezia, hemorrhoids and melena.   Neurological:  Negative for dizziness, focal weakness and headaches.       Physical Exam  Wt Readings from Last 1 Encounters:   07/12/23 85.7 kg (189 lb)     BP Readings from Last 3 Encounters:    07/13/23 130/79   03/08/15 126/70     Pulse Readings from Last 1 Encounters:   07/13/23 78     Body mass index is 32.44 kg/m².    Physical Exam  Constitutional:       Appearance: She is well-developed.   HENT:      Head: Atraumatic.   Eyes:      General: No scleral icterus.  Neck:      Vascular: Normal carotid pulses. No carotid bruit, hepatojugular reflux or JVD.   Cardiovascular:      Rate and Rhythm: Normal rate and regular rhythm.      Chest Wall: PMI is not displaced.      Pulses: Intact distal pulses.           Carotid pulses are 2+ on the right side and 2+ on the left side.       Radial pulses are 2+ on the right side and 2+ on the left side.        Dorsalis pedis pulses are 2+ on the right side and 2+ on the left side.      Heart sounds: Normal heart sounds, S1 normal and S2 normal. No murmur heard.    No friction rub.   Pulmonary:      Effort: Pulmonary effort is normal. No respiratory distress.      Breath sounds: Normal breath sounds. No stridor. No wheezing or rales.   Chest:      Chest wall: No tenderness.   Abdominal:      General: Bowel sounds are normal.      Palpations: Abdomen is soft.   Musculoskeletal:      Cervical back: Neck supple. No edema.   Skin:     General: Skin is warm and dry.      Nails: There is no clubbing.   Neurological:      Mental Status: She is alert and oriented to person, place, and time.   Psychiatric:         Behavior: Behavior normal.         Thought Content: Thought content normal.         Laboratory:  Trended Lab Data:  Recent Labs   Lab 07/12/23 1915 07/12/23 2223 07/13/23 0459   WBC 14.20* 15.92* 14.90*   HGB 12.4 12.9 11.7*   HCT 38.3 40.4 36.5*    312 318       Recent Labs   Lab 07/12/23 1915 07/13/23 0459    140   K 3.1* 4.0    105   CO2 25 27   BUN 22* 21*    95   CALCIUM 9.4 9.1   MG  --  2.2       Recent Labs   Lab 07/12/23 1915 07/13/23 0459   PROT 6.9 6.4   ALBUMIN 3.5 3.3*   BILITOT 0.2 0.4   AST 17 14   ALT 15 13   ALKPHOS  106 111       Recent Labs   Lab 07/12/23 2223   INR 0.9       Cardiac:   Recent Labs   Lab 07/12/23  1915 07/12/23  2232 07/13/23  0459   TROPONINI 2.711* 2.536* 1.611*   BNP 15  --   --        FLP: No results found for: CHOL, HDL, LDLCALC, TRIG, CHOLHDL  DM:   Lab Results   Component Value Date    CREATININE 1.1 07/13/2023     Thyroid:   Lab Results   Component Value Date    TSH 1.411 07/13/2023     Anemia: No results found for: IRON, TIBC, FERRITIN, [id number], FOLATE  Urinalysis: No results found for: LABURIN, COLORU, CLARITYU, SPECGRAV, LABSPEC, NITRITE, PROTEINUR, GLUCOSEU, KETONESU, UROBILINOGEN, BILIRUBINUR, BLOODU  @    Other Results:  EKG (my interpretation):    TELEMETRY:  sinus    Echo: No results found for this or any previous visit.    Radiology:  X-Ray Chest AP Portable    Result Date: 7/12/2023  EXAMINATION: XR CHEST AP PORTABLE CLINICAL HISTORY: Chest Pain; TECHNIQUE: Single frontal view of the chest was performed. COMPARISON: March 2015. FINDINGS: Cardiac silhouette is normal in size.  Lungs are symmetrically expanded.  No evidence of focal consolidative process, pneumothorax, or significant pleural effusion.  No acute osseous abnormality identified.     No acute cardiopulmonary process identified. Electronically signed by: Luanne Talbert MD Date:    07/12/2023 Time:    20:36        Current Medications:     Infusions:   sodium chloride 0.9% Stopped (07/13/23 0735)    heparin (porcine) in D5W Stopped (07/13/23 0735)        Scheduled:   amLODIPine  10 mg Oral Daily    aspirin  81 mg Oral Daily    atorvastatin  80 mg Oral Daily    cetirizine  10 mg Oral Daily    FLUoxetine  40 mg Oral Daily    fluticasone furoate-vilanteroL  1 puff Inhalation Daily    gabapentin  600 mg Oral TID    hydrOXYzine HCL  25 mg Oral Daily    metoprolol succinate  25 mg Oral Daily    mirtazapine  15 mg Oral QHS    pantoprazole  40 mg Oral Daily    senna-docusate 8.6-50 mg  1 tablet Oral BID        PRN:  acetaminophen,  bisacodyL, diazePAM, heparin (PORCINE), heparin (PORCINE), HYDROcodone-acetaminophen, melatonin, nitroGLYCERIN, ondansetron, ondansetron, polyethylene glycol, sodium chloride 0.9%      Assessment and Plan:  ACS/NSTEMI, peaked troponin of 2.7 and down to 1.6.  -treated with ASA, IV heparin, high-intensity statin  -discussed her diagnosis of NSTEMI.  The risks, benefits, indications and alternatives of the planned procedure were discussed in details.  Discussed with patient the risks and benefits of the procedure including, but not limited to, the following; 1:1000 risk of heart attack, stroke and death with 3-5% risk of bleeding, vessel damage (in the arms, legs or in the heart), and the need for emergent surgery, including open heart surgery.  All questions were answered.  The patient agreed to proceed.     HTN  -continue home meds, monitor and adjust    HLP  -atorvastatin 80 mg daily    KITA, improved Cr to 1.1 today from 1.6      Thank you for allowing me to participate in the care of Naomy Licona.      Jordan Uriostegui MD, F.A.C.C, F.S.C.A.I.    Disclaimer: This document was created using voice recognition software (M*Modal Fluency Direct). Although it may be edited, this document may contain errors related to incorrect recognition of the spoken word. Please call the physician if clarification is needed.

## 2023-07-13 NOTE — ED NOTES
PATIENT APTT RESULTED 61.1SEC PER PROTOCOL NO CHANGE IN HEPARIN DRIP IN THERAPEUTIC RANGE AT THIS TIME

## 2023-07-13 NOTE — ED NOTES
Pt rounding complete.  Patient resting in bed. Aaox4. Respirations even and unlabored. Pt denies chest pain or shortness of breath. Currently on heparin infusion at 12 units/hr per nomogram.  Restroom and comfort needs addressed.  Pt updated on plan of care.  Call light within reach.  Will continue to monitor.

## 2023-07-18 ENCOUNTER — TELEPHONE (OUTPATIENT)
Dept: PODIATRY | Facility: CLINIC | Age: 58
End: 2023-07-18
Payer: MEDICAID

## 2023-07-18 NOTE — TELEPHONE ENCOUNTER
Staff gave patient number to Cardiologist Department. Staff informed patient the message was routed to wrong Department.    Patient verbalized understanding.      ----- Message from Meghana Huang sent at 7/18/2023  9:15 AM CDT -----  Regarding: Return Call  Who Called: BRENDAN LEBRON [[phone]]         Who Left Message for Patient: Tamika         Does the patient know what this is regarding? Yes; schedule         Best Call Back Number: [phone]         Additional Information: Patient is returning a call.  Please assist.

## 2023-07-23 NOTE — DISCHARGE SUMMARY
[facility] Surg [address] Medicine  Discharge Summary      Patient Name: Naomy Licona  MRN: [id number]  LIAT: [id number]  Patient Class: OP- Observation  Admission Date: 7/12/2023  Hospital Length of Stay: 0 days  Discharge Date and Time: 7/13/2023  7:43 PM  Attending Physician: No att. providers found   Discharging Provider: Yvan Mckeon MD  Primary Care Provider: Guanakito Valdez MD    Primary Care Team: Networked reference to record PCT     HPI:   Ms. Licona is a 58 YOF with PMHx of HTN, HLD, anxiety/depression, and asthma. She presents to ED with complaints of burning mid-sternal chest and epigastric pain that began last night with radiation to R shoulder with associated nausea and diaphoresis. She took Tums last night without relief in pain. She notes that pain returned today prompting her ED visit.     She reports that she has been feeling fatigued with malaise for last few weeks and was seen two times at urgent care with diagnosis of otitis media. She initially took bactrim for OM however developed rash and was given cream, alternative antibiotic (unable to recall name), and prednisone which she completed course.      She denies SOB, MAYER,  abdominal pain, V/D, constipation, urinary symptoms or hematuria, decreases UOP, changes in bowel habits or blood in stool, decreased appetite, changes in PO intake, light headiness, dizziness, seizures, or syncope. She lives with family, uses no ambulatory aides, is independent in ADLs. Denies alcohol, tobacco, or illicit drug use. She has family history of CAD/MI in mother, father, and sister.     In the ED she is HDS and afebrile. CBC with WBC 16, H/H stable. Chemistry with K 3.1 (repleted), BUN 22, SCr 1.6. LFTs WNL. BNP 15. Troponin 2.711 >> 2.536. CXR without acute cardiopulmonary process. EKG with NSR and nonspecific ST an T wave changes (no prior for comparison). She was given ASA, initiated on heparin gtt, given IVFs, and Cardiology  consulted. The patient was admitted to the Hospital Medicine Service for further evaluation and management.       Procedure(s) (LRB):  ANGIOGRAM, CORONARY ARTERY (N/A)      Hospital Course:   Placed in observation and diagnosed with NSTEMI, KITA and hypokalemia.  Cardiology consulted and input appreciated.  Taken for angiogram which showed normal coronary arteries.  Symptoms resolved and she was cleared for discharge.  Plan to continue with aspirin, atorvastatin, metoprolo and prn NTG in addition to home medications.  Potassium replaced and normal at discharge.  Cr 1.6 on admit.  Treated with IV fluids and Cr normal at discharge.  Follow up with pcp and cardiology.    Goals of Care Treatment Preferences:  Code Status: Full Code      Consults:   Consults (From admission, onward)        Status Ordering Provider     Inpatient consult to Cardiology  Once        Provider:  (Not yet assigned)    Completed AMEYA TAVERA          No new Assessment & Plan notes have been filed under this hospital service since the last note was generated.  Service: Hospital Medicine    Final Active Diagnoses:    Diagnosis Date Noted POA    PRINCIPAL PROBLEM:  NSTEMI (non-ST elevated myocardial infarction) [I21.4] 07/12/2023 Yes    Anxiety and depression [F41.9, F32.A] 07/13/2023 Yes    Chronic neck pain [M54.2, G89.29] 07/13/2023 Unknown    Primary hypertension [I10] 07/12/2023 Yes    Hyperlipidemia [E78.5] 07/12/2023 Yes    Asthma [J45.909] 07/12/2023 Yes    Leukocytosis [D72.829] 07/12/2023 Yes    KITA (acute kidney injury) [N17.9] 07/12/2023 Yes      Problems Resolved During this Admission:       Discharged Condition: stable    Disposition: Home or Self Care    Follow Up:   Follow-up Information     Guanakito Valdez MD Follow up on 7/18/2023.    Specialty: Family Medicine  Why: 11:00am  Contact information:  [address] [facility] [id number] [phone]                        Patient Instructions:      Ambulatory referral/consult to Cardiology   Standing Status: Future   Referral Priority: Routine Referral Type: Consultation   Referral Reason: Specialty Services Required   Referred to Provider: GIANNI WILSON Requested Specialty: Cardiology   Number of Visits Requested: 1     Diet diabetic     Diet Cardiac     Notify your health care provider if you experience any of the following:  increased confusion or weakness     Notify your health care provider if you experience any of the following:  persistent dizziness, light-headedness, or visual disturbances     Notify your health care provider if you experience any of the following:  worsening rash     Notify your health care provider if you experience any of the following:  severe persistent headache     Notify your health care provider if you experience any of the following:  difficulty breathing or increased cough     Notify your health care provider if you experience any of the following:  severe uncontrolled pain     Notify your health care provider if you experience any of the following:  persistent nausea and vomiting or diarrhea     Notify your health care provider if you experience any of the following:  temperature >100.4     Activity as tolerated       Significant Diagnostic Studies: Labs:   BMP: No results for input(s): GLU, NA, K, CL, CO2, BUN, CREATININE, CALCIUM, MG in the last 48 hours., CMP No results for input(s): NA, K, CL, CO2, GLU, BUN, CREATININE, CALCIUM, PROT, ALBUMIN, BILITOT, ALKPHOS, AST, ALT, ANIONGAP, ESTGFRAFRICA, EGFRNONAA in the last 48 hours., CBC No results for input(s): WBC, HGB, HCT, PLT in the last 48 hours., INR   Lab Results   Component Value Date    INR 0.9 07/12/2023   , Lipid Panel   Lab Results   Component Value Date    CHOL 168 07/12/2023    HDL 44 07/12/2023    LDLCALC 95.2 07/12/2023    TRIG 144 07/12/2023    CHOLHDL 26.2 07/12/2023   , Troponin No results for input(s): TROPONINI in the last  168 hours. and A1C:   Recent Labs   Lab 07/13/23  0459   HGBA1C 6.0*       Pending Diagnostic Studies:     None         Medications:  Reconciled Home Medications:      Medication List      START taking these medications    aspirin 81 MG Chew  Take 1 tablet (81 mg total) by mouth once daily.     metoprolol succinate 25 MG 24 hr tablet  Commonly known as: TOPROL-XL  Take 1 tablet (25 mg total) by mouth once daily.     nitroGLYCERIN 0.4 MG SL tablet  Commonly known as: NITROSTAT  Place 1 tablet (0.4 mg total) under the tongue every 5 (five) minutes as needed for Chest pain.        CHANGE how you take these medications    atorvastatin 80 MG tablet  Commonly known as: LIPITOR  Take 1 tablet (80 mg total) by mouth every evening.  What changed:   · medication strength  · how much to take  · when to take this        CONTINUE taking these medications    amLODIPine 10 MG tablet  Commonly known as: NORVASC  Take 10 mg by mouth once daily.     budesonide-formoterol 160-4.5 mcg 160-4.5 mcg/actuation Hfaa  Commonly known as: SYMBICORT  Inhale 2 puffs into the lungs every 12 (twelve) hours.     FLUoxetine 40 MG capsule  Take 40 mg by mouth once daily.     gabapentin 600 MG tablet  Commonly known as: NEURONTIN  Take 600 mg by mouth 3 (three) times daily.     hydrOXYzine HCL 25 MG tablet  Commonly known as: ATARAX  Take 25 mg by mouth once daily.     loratadine 10 mg tablet  Commonly known as: CLARITIN  Take 10 mg by mouth once daily.     mirtazapine 15 MG tablet  Commonly known as: REMERON  Take 15 mg by mouth every evening.     omeprazole 40 MG capsule  Commonly known as: PRILOSEC  Take 40 mg by mouth once daily.        STOP taking these medications    losartan 50 MG tablet  Commonly known as: COZAAR     meloxicam 7.5 MG tablet  Commonly known as: MOBIC            Indwelling Lines/Drains at time of discharge:   Lines/Drains/Airways     None                 Time spent on the discharge of patient: 35 minutes         Yvan Mckeon,  MD  Department of Hospital Medicine  [facility] ([address])

## 2023-08-24 DIAGNOSIS — M50.30 DEGENERATION, INTERVERTEBRAL DISC, CERVICAL: ICD-10-CM

## 2023-08-24 DIAGNOSIS — R29.2 HYPERREFLEXIA: ICD-10-CM

## 2023-08-24 DIAGNOSIS — M48.02 CERVICAL STENOSIS OF SPINAL CANAL: ICD-10-CM

## 2023-08-24 DIAGNOSIS — M48.02 FORAMINAL STENOSIS OF CERVICAL REGION: Primary | ICD-10-CM

## 2023-10-16 PROBLEM — I21.4 NSTEMI (NON-ST ELEVATED MYOCARDIAL INFARCTION): Status: RESOLVED | Noted: 2023-07-12 | Resolved: 2023-10-16

## 2023-10-16 PROBLEM — N17.9 AKI (ACUTE KIDNEY INJURY): Status: RESOLVED | Noted: 2023-07-12 | Resolved: 2023-10-16

## 2023-10-30 ENCOUNTER — TELEPHONE (OUTPATIENT)
Dept: CARDIOLOGY | Facility: CLINIC | Age: 58
End: 2023-10-30
Payer: MEDICAID

## 2023-10-31 ENCOUNTER — OFFICE VISIT (OUTPATIENT)
Dept: CARDIOLOGY | Facility: CLINIC | Age: 58
End: 2023-10-31
Payer: MEDICAID

## 2023-10-31 VITALS
BODY MASS INDEX: 34.97 KG/M2 | WEIGHT: 203.69 LBS | SYSTOLIC BLOOD PRESSURE: 122 MMHG | DIASTOLIC BLOOD PRESSURE: 82 MMHG | HEART RATE: 74 BPM | OXYGEN SATURATION: 99 %

## 2023-10-31 DIAGNOSIS — I10 PRIMARY HYPERTENSION: ICD-10-CM

## 2023-10-31 DIAGNOSIS — I21.4 NSTEMI (NON-ST ELEVATED MYOCARDIAL INFARCTION): ICD-10-CM

## 2023-10-31 DIAGNOSIS — E78.5 HYPERLIPIDEMIA, UNSPECIFIED HYPERLIPIDEMIA TYPE: Primary | ICD-10-CM

## 2023-10-31 PROCEDURE — 99214 OFFICE O/P EST MOD 30 MIN: CPT | Mod: S$PBB,,, | Performed by: INTERNAL MEDICINE

## 2023-10-31 PROCEDURE — 3008F BODY MASS INDEX DOCD: CPT | Mod: CPTII,,, | Performed by: INTERNAL MEDICINE

## 2023-10-31 PROCEDURE — 3044F HG A1C LEVEL LT 7.0%: CPT | Mod: CPTII,,, | Performed by: INTERNAL MEDICINE

## 2023-10-31 PROCEDURE — 99999 PR PBB SHADOW E&M-EST. PATIENT-LVL III: CPT | Mod: PBBFAC,,, | Performed by: INTERNAL MEDICINE

## 2023-10-31 PROCEDURE — 99214 PR OFFICE/OUTPT VISIT, EST, LEVL IV, 30-39 MIN: ICD-10-PCS | Mod: S$PBB,,, | Performed by: INTERNAL MEDICINE

## 2023-10-31 PROCEDURE — 3074F PR MOST RECENT SYSTOLIC BLOOD PRESSURE < 130 MM HG: ICD-10-PCS | Mod: CPTII,,, | Performed by: INTERNAL MEDICINE

## 2023-10-31 PROCEDURE — 99999 PR PBB SHADOW E&M-EST. PATIENT-LVL III: ICD-10-PCS | Mod: PBBFAC,,, | Performed by: INTERNAL MEDICINE

## 2023-10-31 PROCEDURE — 3079F PR MOST RECENT DIASTOLIC BLOOD PRESSURE 80-89 MM HG: ICD-10-PCS | Mod: CPTII,,, | Performed by: INTERNAL MEDICINE

## 2023-10-31 PROCEDURE — 3074F SYST BP LT 130 MM HG: CPT | Mod: CPTII,,, | Performed by: INTERNAL MEDICINE

## 2023-10-31 PROCEDURE — 3044F PR MOST RECENT HEMOGLOBIN A1C LEVEL <7.0%: ICD-10-PCS | Mod: CPTII,,, | Performed by: INTERNAL MEDICINE

## 2023-10-31 PROCEDURE — 3079F DIAST BP 80-89 MM HG: CPT | Mod: CPTII,,, | Performed by: INTERNAL MEDICINE

## 2023-10-31 PROCEDURE — 3008F PR BODY MASS INDEX (BMI) DOCUMENTED: ICD-10-PCS | Mod: CPTII,,, | Performed by: INTERNAL MEDICINE

## 2023-10-31 PROCEDURE — 1159F PR MEDICATION LIST DOCUMENTED IN MEDICAL RECORD: ICD-10-PCS | Mod: CPTII,,, | Performed by: INTERNAL MEDICINE

## 2023-10-31 PROCEDURE — 99213 OFFICE O/P EST LOW 20 MIN: CPT | Mod: PBBFAC,PN | Performed by: INTERNAL MEDICINE

## 2023-10-31 PROCEDURE — 1159F MED LIST DOCD IN RCRD: CPT | Mod: CPTII,,, | Performed by: INTERNAL MEDICINE

## 2023-10-31 RX ORDER — ERGOCALCIFEROL 1.25 MG/1
50000 CAPSULE ORAL
COMMUNITY
Start: 2023-09-14

## 2023-10-31 RX ORDER — CETIRIZINE HYDROCHLORIDE 10 MG/1
10 TABLET ORAL DAILY
COMMUNITY

## 2023-10-31 RX ORDER — MONTELUKAST SODIUM 10 MG/1
10 TABLET ORAL
COMMUNITY
Start: 2023-10-09

## 2023-10-31 RX ORDER — EZETIMIBE 10 MG/1
10 TABLET ORAL
COMMUNITY
Start: 2023-09-25

## 2023-10-31 NOTE — PROGRESS NOTES
Cardiology    10/31/2023  10:41 AM    Problem list  Patient Active Problem List   Diagnosis    Primary hypertension    Hyperlipidemia    Asthma    Leukocytosis    Anxiety and depression    Chronic neck pain       CC:  F/u    HPI:  Hospital discharge follow-up.  She was admitted in July with chest pain.  She underwent coronary angiogram which showed normal coronary arteries.  Her echocardiogram showed good left ventricular function.  She is here for follow-up.  Last week, she had episode of chest pain and she took 3 sublingual nitroglycerin.  She does have history of reflux for which she is taking omeprazole.  She is also using more Tums.  She states that Tums and Pepto-Bismol also helps with the chest pain.  She has not seen GI doctor.  Her blood pressure is well controlled.    Medications  Current Outpatient Medications   Medication Sig Dispense Refill    amLODIPine (NORVASC) 10 MG tablet Take 5 mg by mouth once daily.      aspirin 81 MG Chew Take 1 tablet (81 mg total) by mouth once daily. 30 tablet 1    atorvastatin (LIPITOR) 80 MG tablet Take 1 tablet (80 mg total) by mouth every evening. 30 tablet 1    cetirizine (ZYRTEC) 10 MG tablet Take 10 mg by mouth once daily.      ergocalciferol (ERGOCALCIFEROL) 50,000 unit Cap Take 50,000 Units by mouth every 7 days.      ezetimibe (ZETIA) 10 mg tablet Take 10 mg by mouth.      FLUoxetine 40 MG capsule Take 40 mg by mouth once daily.      gabapentin (NEURONTIN) 600 MG tablet Take 600 mg by mouth 3 (three) times daily.      hydrOXYzine HCL (ATARAX) 25 MG tablet Take 25 mg by mouth once daily.      metoprolol succinate (TOPROL-XL) 25 MG 24 hr tablet Take 1 tablet (25 mg total) by mouth once daily. 30 tablet 1    mirtazapine (REMERON) 15 MG tablet Take 15 mg by mouth every evening.      montelukast (SINGULAIR) 10 mg tablet Take 10 mg by mouth.      nitroGLYCERIN (NITROSTAT) 0.4 MG SL tablet Place 1 tablet (0.4 mg total) under the tongue every 5 (five) minutes as  needed for Chest pain. 25 tablet 1    omeprazole (PRILOSEC) 40 MG capsule Take 40 mg by mouth once daily.      budesonide-formoterol 160-4.5 mcg (SYMBICORT) 160-4.5 mcg/actuation HFAA Inhale 2 puffs into the lungs every 12 (twelve) hours.       No current facility-administered medications for this visit.      Prior to Admission medications    Medication Sig Start Date End Date Taking? Authorizing Provider   amLODIPine (NORVASC) 10 MG tablet Take 5 mg by mouth once daily.   Yes Provider, Historical   aspirin 81 MG Chew Take 1 tablet (81 mg total) by mouth once daily. 7/14/23 7/13/24 Yes Yvan Mckeon MD   atorvastatin (LIPITOR) 80 MG tablet Take 1 tablet (80 mg total) by mouth every evening. 7/13/23 7/12/24 Yes Yvan Mckeon MD   cetirizine (ZYRTEC) 10 MG tablet Take 10 mg by mouth once daily.   Yes Provider, Historical   ergocalciferol (ERGOCALCIFEROL) 50,000 unit Cap Take 50,000 Units by mouth every 7 days. 9/14/23  Yes Provider, Historical   ezetimibe (ZETIA) 10 mg tablet Take 10 mg by mouth. 9/25/23  Yes Provider, Historical   FLUoxetine 40 MG capsule Take 40 mg by mouth once daily.   Yes Provider, Historical   gabapentin (NEURONTIN) 600 MG tablet Take 600 mg by mouth 3 (three) times daily.   Yes Provider, Historical   hydrOXYzine HCL (ATARAX) 25 MG tablet Take 25 mg by mouth once daily.   Yes Provider, Historical   metoprolol succinate (TOPROL-XL) 25 MG 24 hr tablet Take 1 tablet (25 mg total) by mouth once daily. 7/14/23 7/13/24 Yes Yvan Mckeon MD   mirtazapine (REMERON) 15 MG tablet Take 15 mg by mouth every evening.   Yes Provider, Historical   montelukast (SINGULAIR) 10 mg tablet Take 10 mg by mouth. 10/9/23  Yes Provider, Historical   nitroGLYCERIN (NITROSTAT) 0.4 MG SL tablet Place 1 tablet (0.4 mg total) under the tongue every 5 (five) minutes as needed for Chest pain. 7/13/23 7/12/24 Yes Yvan Mckeon MD   omeprazole (PRILOSEC) 40 MG capsule Take 40 mg by mouth once daily.   Yes Provider,  Historical   budesonide-formoterol 160-4.5 mcg (SYMBICORT) 160-4.5 mcg/actuation HFAA Inhale 2 puffs into the lungs every 12 (twelve) hours.    Provider, Historical   loratadine (CLARITIN) 10 mg tablet Take 10 mg by mouth once daily.  10/31/23  Provider, Historical         History  Past Medical History:   Diagnosis Date    Asthma     Hypertension      Past Surgical History:   Procedure Laterality Date    CHOLECYSTECTOMY      CORONARY ANGIOGRAPHY N/A 7/13/2023    Procedure: ANGIOGRAM, CORONARY ARTERY;  Surgeon: Jordan Uriostegui MD;  Location: [facility] CATH LAB;  Service: Cardiology;  Laterality: N/A;    TUBAL LIGATION       Social History     Socioeconomic History    Marital status:    Tobacco Use    Smoking status: Former    Smokeless tobacco: Never   Substance and Sexual Activity    Alcohol use: No    Drug use: No     Social Determinants of Health     Financial Resource Strain: Low Risk  (7/13/2023)    Overall Financial Resource Strain (CARDIA)     Difficulty of Paying Living Expenses: Not hard at all   Food Insecurity: No Food Insecurity (7/13/2023)    Hunger Vital Sign     Worried About Running Out of Food in the Last Year: Never true     Ran Out of Food in the Last Year: Never true   Transportation Needs: No Transportation Needs (7/13/2023)    PRAPARE - Transportation     Lack of Transportation (Medical): No     Lack of Transportation (Non-Medical): No   Physical Activity: Insufficiently Active (7/13/2023)    Exercise Vital Sign     Days of Exercise per Week: 2 days     Minutes of Exercise per Session: 20 min   Stress: No Stress Concern Present (7/13/2023)    [employer] [employer] - Occupational Stress Questionnaire     Feeling of Stress : Not at all   Social Connections: Socially Isolated (7/13/2023)    Social Connection and Isolation Panel [NHANES]     Frequency of Communication with Friends and Family: More than three times a week     Frequency of Social Gatherings with Friends and  Family: Three times a week     Attends Baptism Services: Never     Active Member of Clubs or Organizations: No     Attends Club or Organization Meetings: Never     Marital Status:    Housing Stability: Low Risk  (7/13/2023)    Housing Stability Vital Sign     Unable to Pay for Housing in the Last Year: No     Number of Places Lived in the Last Year: 1     Unstable Housing in the Last Year: No         Allergies  Review of patient's allergies indicates:  No Known Allergies      Review of Systems   Review of Systems   Constitutional: Negative for decreased appetite, fever and weight loss.   HENT:  Negative for congestion and nosebleeds.    Eyes:  Negative for double vision, vision loss in left eye, vision loss in right eye and visual disturbance.   Cardiovascular:  Positive for chest pain. Negative for claudication, cyanosis, dyspnea on exertion, irregular heartbeat, leg swelling, near-syncope, orthopnea, palpitations, paroxysmal nocturnal dyspnea and syncope.   Respiratory:  Negative for cough, hemoptysis, shortness of breath, sleep disturbances due to breathing, snoring, sputum production and wheezing.    Endocrine: Negative for cold intolerance and heat intolerance.   Skin:  Negative for nail changes and rash.   Musculoskeletal:  Negative for joint pain, muscle cramps, muscle weakness and myalgias.   Gastrointestinal:  Negative for change in bowel habit, heartburn, hematemesis, hematochezia, hemorrhoids and melena.   Neurological:  Negative for dizziness, focal weakness and headaches.         Physical Exam  Wt Readings from Last 1 Encounters:   10/31/23 92.4 kg (203 lb 11.3 oz)     BP Readings from Last 3 Encounters:   10/31/23 122/82   07/13/23 137/69   03/08/15 126/70     Pulse Readings from Last 1 Encounters:   10/31/23 74     Body mass index is 34.97 kg/m².    Physical Exam  Vitals reviewed.   Constitutional:       Appearance: She is well-developed. She is obese.   HENT:      Head: Atraumatic.   Eyes:       General: No scleral icterus.  Neck:      Vascular: Normal carotid pulses. No carotid bruit, hepatojugular reflux or JVD.   Cardiovascular:      Rate and Rhythm: Normal rate and regular rhythm.      Chest Wall: PMI is not displaced.      Pulses: Intact distal pulses.           Carotid pulses are 2+ on the right side and 2+ on the left side.       Radial pulses are 2+ on the right side and 2+ on the left side.        Dorsalis pedis pulses are 2+ on the right side and 2+ on the left side.      Heart sounds: Normal heart sounds, S1 normal and S2 normal. No murmur heard.     No friction rub.   Pulmonary:      Effort: Pulmonary effort is normal. No respiratory distress.      Breath sounds: Normal breath sounds. No stridor. No wheezing or rales.   Chest:      Chest wall: No tenderness.   Abdominal:      General: Bowel sounds are normal.      Palpations: Abdomen is soft.   Musculoskeletal:      Cervical back: Neck supple. No edema.   Skin:     General: Skin is warm and dry.      Nails: There is no clubbing.   Neurological:      Mental Status: She is alert and oriented to person, place, and time.   Psychiatric:         Behavior: Behavior normal.         Thought Content: Thought content normal.             Assessment  1. NSTEMI (non-ST elevated myocardial infarction)  Normal coronary arteries, normal angiogram and normal echocardiogram  - Ambulatory referral/consult to Cardiology    2. Hyperlipidemia, unspecified hyperlipidemia type  On statin and Zetia    3. Primary hypertension  Well controlled on current medications, continue medications and monitor    4.  CP, likely GI related        Plan and Discussion  Recommend to continue current medications.  Recommend to follow up with PCP and consider GI referral.  Her blood pressure is well controlled with amlodipine and metoprolol.  She is on good medical regimen.  Given her normal coronary angiogram and echocardiogram in July, no further cardiac testing at this  point.    Follow Up  6 months at [facility]      Jordan Uriostegui MD, F.A.C.C, F.S.C.A.I.        30 minutes were spent in chart review, documentation and review of results, and evaluation, treatment, and counseling of patient on the same day of service.    Disclaimer: This document was created using voice recognition software (Screenleap). Although it may be edited, this document may contain errors related to incorrect recognition of the spoken word. Please call the physician if clarification is needed.

## 2024-07-14 ENCOUNTER — HOSPITAL ENCOUNTER (OUTPATIENT)
Facility: OTHER | Age: 59
Discharge: HOME | End: 2024-07-15
Attending: EMERGENCY MEDICINE | Admitting: EMERGENCY MEDICINE
Payer: MEDICAID

## 2024-07-14 DIAGNOSIS — R11.0 NAUSEA: ICD-10-CM

## 2024-07-14 DIAGNOSIS — R10.13 EPIGASTRIC ABDOMINAL PAIN: Primary | ICD-10-CM

## 2024-07-14 DIAGNOSIS — A08.4 VIRAL GASTROENTERITIS: ICD-10-CM

## 2024-07-14 LAB
ALBUMIN SERPL BCP-MCNC: 3.8 G/DL (ref 3.5–5.2)
ALP SERPL-CCNC: 125 U/L (ref 55–135)
ALT SERPL W/O P-5'-P-CCNC: 11 U/L (ref 10–44)
ANION GAP SERPL CALC-SCNC: 11 MMOL/L (ref 8–16)
AST SERPL-CCNC: 13 U/L (ref 10–40)
BASOPHILS # BLD AUTO: 0.07 K/UL (ref 0–0.2)
BASOPHILS NFR BLD: 0.7 % (ref 0–1.9)
BILIRUB SERPL-MCNC: 0.3 MG/DL (ref 0.1–1)
BILIRUB UR QL STRIP: NEGATIVE
BUN SERPL-MCNC: 11 MG/DL (ref 6–20)
CALCIUM SERPL-MCNC: 9.8 MG/DL (ref 8.7–10.5)
CHLORIDE SERPL-SCNC: 107 MMOL/L (ref 95–110)
CLARITY UR: CLEAR
CO2 SERPL-SCNC: 23 MMOL/L (ref 23–29)
COLOR UR: COLORLESS
CREAT SERPL-MCNC: 1.1 MG/DL (ref 0.5–1.4)
DIFFERENTIAL METHOD BLD: ABNORMAL
EOSINOPHIL # BLD AUTO: 0.2 K/UL (ref 0–0.5)
EOSINOPHIL NFR BLD: 2.1 % (ref 0–8)
ERYTHROCYTE [DISTWIDTH] IN BLOOD BY AUTOMATED COUNT: 14.6 % (ref 11.5–14.5)
EST. GFR  (NO RACE VARIABLE): 58 ML/MIN/1.73 M^2
GLUCOSE SERPL-MCNC: 99 MG/DL (ref 70–110)
GLUCOSE UR QL STRIP: NEGATIVE
HCT VFR BLD AUTO: 41.6 % (ref 37–48.5)
HCV AB SERPL QL IA: NEGATIVE
HGB BLD-MCNC: 13.9 G/DL (ref 12–16)
HGB UR QL STRIP: NEGATIVE
HIV 1+2 AB+HIV1 P24 AG SERPL QL IA: NEGATIVE
IMM GRANULOCYTES # BLD AUTO: 0.02 K/UL (ref 0–0.04)
IMM GRANULOCYTES NFR BLD AUTO: 0.2 % (ref 0–0.5)
KETONES UR QL STRIP: NEGATIVE
LEUKOCYTE ESTERASE UR QL STRIP: NEGATIVE
LIPASE SERPL-CCNC: 32 U/L (ref 4–60)
LYMPHOCYTES # BLD AUTO: 4.4 K/UL (ref 1–4.8)
LYMPHOCYTES NFR BLD: 43.7 % (ref 18–48)
MCH RBC QN AUTO: 29.2 PG (ref 27–31)
MCHC RBC AUTO-ENTMCNC: 33.4 G/DL (ref 32–36)
MCV RBC AUTO: 87 FL (ref 82–98)
MONOCYTES # BLD AUTO: 0.8 K/UL (ref 0.3–1)
MONOCYTES NFR BLD: 7.9 % (ref 4–15)
NEUTROPHILS # BLD AUTO: 4.5 K/UL (ref 1.8–7.7)
NEUTROPHILS NFR BLD: 45.4 % (ref 38–73)
NITRITE UR QL STRIP: NEGATIVE
NRBC BLD-RTO: 0 /100 WBC
PH UR STRIP: 6 [PH] (ref 5–8)
PLATELET # BLD AUTO: 318 K/UL (ref 150–450)
PLATELET BLD QL SMEAR: ABNORMAL
PMV BLD AUTO: 9 FL (ref 9.2–12.9)
POTASSIUM SERPL-SCNC: 3.7 MMOL/L (ref 3.5–5.1)
PROT SERPL-MCNC: 7.1 G/DL (ref 6–8.4)
PROT UR QL STRIP: NEGATIVE
RBC # BLD AUTO: 4.76 M/UL (ref 4–5.4)
SODIUM SERPL-SCNC: 141 MMOL/L (ref 136–145)
SP GR UR STRIP: 1.01 (ref 1–1.03)
URN SPEC COLLECT METH UR: ABNORMAL
UROBILINOGEN UR STRIP-ACNC: NEGATIVE EU/DL
WBC # BLD AUTO: 9.95 K/UL (ref 3.9–12.7)

## 2024-07-14 PROCEDURE — 63600175 PHARM REV CODE 636 W HCPCS: Performed by: PHYSICIAN ASSISTANT

## 2024-07-14 PROCEDURE — 25000003 PHARM REV CODE 250: Performed by: PHYSICIAN ASSISTANT

## 2024-07-14 PROCEDURE — 93005 ELECTROCARDIOGRAM TRACING: CPT

## 2024-07-14 PROCEDURE — 83690 ASSAY OF LIPASE: CPT | Performed by: PHYSICIAN ASSISTANT

## 2024-07-14 PROCEDURE — 25000003 PHARM REV CODE 250

## 2024-07-14 PROCEDURE — 96374 THER/PROPH/DIAG INJ IV PUSH: CPT

## 2024-07-14 PROCEDURE — 80053 COMPREHEN METABOLIC PANEL: CPT | Performed by: PHYSICIAN ASSISTANT

## 2024-07-14 PROCEDURE — 81003 URINALYSIS AUTO W/O SCOPE: CPT | Performed by: PHYSICIAN ASSISTANT

## 2024-07-14 PROCEDURE — 96375 TX/PRO/DX INJ NEW DRUG ADDON: CPT

## 2024-07-14 PROCEDURE — G0378 HOSPITAL OBSERVATION PER HR: HCPCS

## 2024-07-14 PROCEDURE — 86803 HEPATITIS C AB TEST: CPT | Performed by: EMERGENCY MEDICINE

## 2024-07-14 PROCEDURE — 93010 ELECTROCARDIOGRAM REPORT: CPT | Mod: ,,, | Performed by: INTERNAL MEDICINE

## 2024-07-14 PROCEDURE — 87389 HIV-1 AG W/HIV-1&-2 AB AG IA: CPT | Performed by: EMERGENCY MEDICINE

## 2024-07-14 PROCEDURE — 85025 COMPLETE CBC W/AUTO DIFF WBC: CPT | Performed by: PHYSICIAN ASSISTANT

## 2024-07-14 PROCEDURE — 99285 EMERGENCY DEPT VISIT HI MDM: CPT | Mod: 25

## 2024-07-14 PROCEDURE — 96361 HYDRATE IV INFUSION ADD-ON: CPT

## 2024-07-14 PROCEDURE — 25500020 PHARM REV CODE 255: Performed by: PHYSICIAN ASSISTANT

## 2024-07-14 RX ORDER — HYDROMORPHONE HYDROCHLORIDE 1 MG/ML
1 INJECTION, SOLUTION INTRAMUSCULAR; INTRAVENOUS; SUBCUTANEOUS
Status: COMPLETED | OUTPATIENT
Start: 2024-07-14 | End: 2024-07-14

## 2024-07-14 RX ORDER — PANTOPRAZOLE SODIUM 40 MG/10ML
40 INJECTION, POWDER, LYOPHILIZED, FOR SOLUTION INTRAVENOUS
Status: COMPLETED | OUTPATIENT
Start: 2024-07-14 | End: 2024-07-14

## 2024-07-14 RX ORDER — HYDROMORPHONE HYDROCHLORIDE 1 MG/ML
1 INJECTION, SOLUTION INTRAMUSCULAR; INTRAVENOUS; SUBCUTANEOUS EVERY 4 HOURS PRN
Status: DISCONTINUED | OUTPATIENT
Start: 2024-07-14 | End: 2024-07-15 | Stop reason: HOSPADM

## 2024-07-14 RX ORDER — AMLODIPINE BESYLATE 5 MG/1
5 TABLET ORAL DAILY
Status: DISCONTINUED | OUTPATIENT
Start: 2024-07-15 | End: 2024-07-15 | Stop reason: HOSPADM

## 2024-07-14 RX ORDER — PANTOPRAZOLE SODIUM 40 MG/10ML
40 INJECTION, POWDER, LYOPHILIZED, FOR SOLUTION INTRAVENOUS DAILY
Status: DISCONTINUED | OUTPATIENT
Start: 2024-07-15 | End: 2024-07-15 | Stop reason: HOSPADM

## 2024-07-14 RX ORDER — EZETIMIBE 10 MG/1
10 TABLET ORAL DAILY
Status: DISCONTINUED | OUTPATIENT
Start: 2024-07-15 | End: 2024-07-15 | Stop reason: HOSPADM

## 2024-07-14 RX ORDER — METOPROLOL SUCCINATE 25 MG/1
25 TABLET, EXTENDED RELEASE ORAL DAILY
Status: DISCONTINUED | OUTPATIENT
Start: 2024-07-15 | End: 2024-07-15 | Stop reason: HOSPADM

## 2024-07-14 RX ORDER — ONDANSETRON HYDROCHLORIDE 2 MG/ML
4 INJECTION, SOLUTION INTRAVENOUS EVERY 8 HOURS PRN
Status: DISCONTINUED | OUTPATIENT
Start: 2024-07-14 | End: 2024-07-15 | Stop reason: HOSPADM

## 2024-07-14 RX ORDER — AMLODIPINE BESYLATE 5 MG/1
5 TABLET ORAL DAILY
COMMUNITY

## 2024-07-14 RX ORDER — ACETAMINOPHEN 325 MG/1
650 TABLET ORAL EVERY 8 HOURS PRN
Status: DISCONTINUED | OUTPATIENT
Start: 2024-07-14 | End: 2024-07-15 | Stop reason: HOSPADM

## 2024-07-14 RX ORDER — NAPROXEN SODIUM 220 MG/1
81 TABLET, FILM COATED ORAL DAILY
Status: DISCONTINUED | OUTPATIENT
Start: 2024-07-15 | End: 2024-07-15 | Stop reason: HOSPADM

## 2024-07-14 RX ORDER — MONTELUKAST SODIUM 10 MG/1
10 TABLET ORAL DAILY
Status: DISCONTINUED | OUTPATIENT
Start: 2024-07-15 | End: 2024-07-15 | Stop reason: HOSPADM

## 2024-07-14 RX ORDER — CETIRIZINE HYDROCHLORIDE 5 MG/1
10 TABLET ORAL DAILY
Status: DISCONTINUED | OUTPATIENT
Start: 2024-07-15 | End: 2024-07-15 | Stop reason: HOSPADM

## 2024-07-14 RX ORDER — MORPHINE SULFATE 4 MG/ML
4 INJECTION, SOLUTION INTRAMUSCULAR; INTRAVENOUS
Status: COMPLETED | OUTPATIENT
Start: 2024-07-14 | End: 2024-07-14

## 2024-07-14 RX ORDER — HYDROCODONE BITARTRATE AND ACETAMINOPHEN 5; 325 MG/1; MG/1
1 TABLET ORAL EVERY 4 HOURS PRN
Status: DISCONTINUED | OUTPATIENT
Start: 2024-07-14 | End: 2024-07-15 | Stop reason: HOSPADM

## 2024-07-14 RX ORDER — GABAPENTIN 100 MG/1
600 CAPSULE ORAL 3 TIMES DAILY
Status: DISCONTINUED | OUTPATIENT
Start: 2024-07-15 | End: 2024-07-15 | Stop reason: HOSPADM

## 2024-07-14 RX ORDER — ATORVASTATIN CALCIUM 20 MG/1
80 TABLET, FILM COATED ORAL NIGHTLY
Status: DISCONTINUED | OUTPATIENT
Start: 2024-07-14 | End: 2024-07-15 | Stop reason: HOSPADM

## 2024-07-14 RX ORDER — KETOROLAC TROMETHAMINE 30 MG/ML
10 INJECTION, SOLUTION INTRAMUSCULAR; INTRAVENOUS
Status: DISCONTINUED | OUTPATIENT
Start: 2024-07-14 | End: 2024-07-14

## 2024-07-14 RX ORDER — SODIUM CHLORIDE 0.9 % (FLUSH) 0.9 %
10 SYRINGE (ML) INJECTION
Status: DISCONTINUED | OUTPATIENT
Start: 2024-07-14 | End: 2024-07-15 | Stop reason: HOSPADM

## 2024-07-14 RX ORDER — MIRTAZAPINE 15 MG/1
15 TABLET, FILM COATED ORAL NIGHTLY
Status: DISCONTINUED | OUTPATIENT
Start: 2024-07-14 | End: 2024-07-15 | Stop reason: HOSPADM

## 2024-07-14 RX ORDER — TALC
6 POWDER (GRAM) TOPICAL NIGHTLY PRN
Status: DISCONTINUED | OUTPATIENT
Start: 2024-07-14 | End: 2024-07-15 | Stop reason: HOSPADM

## 2024-07-14 RX ADMIN — ATORVASTATIN CALCIUM 80 MG: 20 TABLET, FILM COATED ORAL at 11:07

## 2024-07-14 RX ADMIN — PANTOPRAZOLE SODIUM 40 MG: 40 INJECTION, POWDER, LYOPHILIZED, FOR SOLUTION INTRAVENOUS at 08:07

## 2024-07-14 RX ADMIN — SODIUM CHLORIDE 1000 ML: 9 INJECTION, SOLUTION INTRAVENOUS at 06:07

## 2024-07-14 RX ADMIN — IOHEXOL 100 ML: 350 INJECTION, SOLUTION INTRAVENOUS at 08:07

## 2024-07-14 RX ADMIN — MIRTAZAPINE 15 MG: 15 TABLET, FILM COATED ORAL at 11:07

## 2024-07-14 RX ADMIN — HYDROMORPHONE HYDROCHLORIDE 1 MG: 1 INJECTION, SOLUTION INTRAMUSCULAR; INTRAVENOUS; SUBCUTANEOUS at 08:07

## 2024-07-14 RX ADMIN — MORPHINE SULFATE 4 MG: 4 INJECTION, SOLUTION INTRAMUSCULAR; INTRAVENOUS at 07:07

## 2024-07-15 VITALS
OXYGEN SATURATION: 98 % | DIASTOLIC BLOOD PRESSURE: 74 MMHG | HEART RATE: 75 BPM | SYSTOLIC BLOOD PRESSURE: 120 MMHG | RESPIRATION RATE: 16 BRPM | HEIGHT: 64 IN | WEIGHT: 198 LBS | BODY MASS INDEX: 33.8 KG/M2 | TEMPERATURE: 98 F

## 2024-07-15 PROBLEM — A08.4 VIRAL GASTROENTERITIS: Status: ACTIVE | Noted: 2024-07-15

## 2024-07-15 LAB
ANION GAP SERPL CALC-SCNC: 8 MMOL/L (ref 8–16)
BASOPHILS # BLD AUTO: 0.06 K/UL (ref 0–0.2)
BASOPHILS NFR BLD: 0.8 % (ref 0–1.9)
BUN SERPL-MCNC: 8 MG/DL (ref 6–20)
CALCIUM SERPL-MCNC: 8.9 MG/DL (ref 8.7–10.5)
CHLORIDE SERPL-SCNC: 109 MMOL/L (ref 95–110)
CO2 SERPL-SCNC: 23 MMOL/L (ref 23–29)
CREAT SERPL-MCNC: 0.9 MG/DL (ref 0.5–1.4)
DIFFERENTIAL METHOD BLD: ABNORMAL
EOSINOPHIL # BLD AUTO: 0.2 K/UL (ref 0–0.5)
EOSINOPHIL NFR BLD: 2.6 % (ref 0–8)
ERYTHROCYTE [DISTWIDTH] IN BLOOD BY AUTOMATED COUNT: 14.8 % (ref 11.5–14.5)
EST. GFR  (NO RACE VARIABLE): >60 ML/MIN/1.73 M^2
GLUCOSE SERPL-MCNC: 105 MG/DL (ref 70–110)
HCT VFR BLD AUTO: 38.3 % (ref 37–48.5)
HGB BLD-MCNC: 12.5 G/DL (ref 12–16)
IMM GRANULOCYTES # BLD AUTO: 0.02 K/UL (ref 0–0.04)
IMM GRANULOCYTES NFR BLD AUTO: 0.3 % (ref 0–0.5)
LYMPHOCYTES # BLD AUTO: 3.4 K/UL (ref 1–4.8)
LYMPHOCYTES NFR BLD: 44.4 % (ref 18–48)
MCH RBC QN AUTO: 28.9 PG (ref 27–31)
MCHC RBC AUTO-ENTMCNC: 32.6 G/DL (ref 32–36)
MCV RBC AUTO: 89 FL (ref 82–98)
MONOCYTES # BLD AUTO: 0.6 K/UL (ref 0.3–1)
MONOCYTES NFR BLD: 8.5 % (ref 4–15)
NEUTROPHILS # BLD AUTO: 3.3 K/UL (ref 1.8–7.7)
NEUTROPHILS NFR BLD: 43.4 % (ref 38–73)
NRBC BLD-RTO: 0 /100 WBC
OHS QRS DURATION: 80 MS
OHS QTC CALCULATION: 464 MS
PLATELET # BLD AUTO: 280 K/UL (ref 150–450)
PMV BLD AUTO: 8.9 FL (ref 9.2–12.9)
POTASSIUM SERPL-SCNC: 3.5 MMOL/L (ref 3.5–5.1)
RBC # BLD AUTO: 4.33 M/UL (ref 4–5.4)
SODIUM SERPL-SCNC: 140 MMOL/L (ref 136–145)
WBC # BLD AUTO: 7.56 K/UL (ref 3.9–12.7)

## 2024-07-15 PROCEDURE — 87046 STOOL CULTR AEROBIC BACT EA: CPT

## 2024-07-15 PROCEDURE — 36415 COLL VENOUS BLD VENIPUNCTURE: CPT

## 2024-07-15 PROCEDURE — 87427 SHIGA-LIKE TOXIN AG IA: CPT

## 2024-07-15 PROCEDURE — 25000003 PHARM REV CODE 250

## 2024-07-15 PROCEDURE — 63600175 PHARM REV CODE 636 W HCPCS

## 2024-07-15 PROCEDURE — 87449 NOS EACH ORGANISM AG IA: CPT

## 2024-07-15 PROCEDURE — 87449 NOS EACH ORGANISM AG IA: CPT | Mod: 91

## 2024-07-15 PROCEDURE — G0378 HOSPITAL OBSERVATION PER HR: HCPCS

## 2024-07-15 PROCEDURE — 80048 BASIC METABOLIC PNL TOTAL CA: CPT

## 2024-07-15 PROCEDURE — 85025 COMPLETE CBC W/AUTO DIFF WBC: CPT

## 2024-07-15 PROCEDURE — 87045 FECES CULTURE AEROBIC BACT: CPT

## 2024-07-15 PROCEDURE — 96376 TX/PRO/DX INJ SAME DRUG ADON: CPT

## 2024-07-15 RX ORDER — DICYCLOMINE HYDROCHLORIDE 20 MG/1
20 TABLET ORAL 2 TIMES DAILY
Qty: 20 TABLET | Refills: 0 | Status: SHIPPED | OUTPATIENT
Start: 2024-07-15 | End: 2024-08-14

## 2024-07-15 RX ADMIN — GABAPENTIN 600 MG: 100 CAPSULE ORAL at 09:07

## 2024-07-15 RX ADMIN — CETIRIZINE HYDROCHLORIDE 10 MG: 5 TABLET, FILM COATED ORAL at 09:07

## 2024-07-15 RX ADMIN — PANTOPRAZOLE SODIUM 40 MG: 40 INJECTION, POWDER, LYOPHILIZED, FOR SOLUTION INTRAVENOUS at 09:07

## 2024-07-15 RX ADMIN — EZETIMIBE 10 MG: 10 TABLET ORAL at 09:07

## 2024-07-15 RX ADMIN — METOPROLOL SUCCINATE 25 MG: 25 TABLET, EXTENDED RELEASE ORAL at 09:07

## 2024-07-15 RX ADMIN — ASPIRIN 81 MG CHEWABLE TABLET 81 MG: 81 TABLET CHEWABLE at 09:07

## 2024-07-15 RX ADMIN — MONTELUKAST 10 MG: 10 TABLET, FILM COATED ORAL at 09:07

## 2024-07-15 RX ADMIN — AMLODIPINE BESYLATE 5 MG: 5 TABLET ORAL at 09:07

## 2024-07-15 NOTE — H&P
ED Observation Unit  History and Physical      I assumed care of this patient from the Main ED at onset of observation time, 2145 on 07/14/2024.       History of Present Illness:  Naomy Licona is a 59 y.o. female presenting with abdominal pain.  Patient complains of upper abdominal pain for approximately 6 days.  She does report some associated nausea, 1 episode of vomiting yesterday and continued diarrhea.  She states she has noted dark stool.  She states that dark stool was noted prior to initiation of Pepto for symptoms.  She reports no improvement with Imodium.  Does report slight improvement in nausea.  States earlier this week she had fever however fevers have since resolved.  She states decreased appetite secondary to pain.  Food does exacerbate symptoms.  She denies any additional GI symptoms,  symptoms, chest pain or shortness of breath.  She takes hydrocodone for previous injury with only modest improvement symptoms     ED Course:  - CBC without leukocytosis or anemia, CMP without electrolyte abnormalities or renal insufficiency, lipase normal  - UA without evidence of UTI  - CT with no acute intra-abdominal etiology  - received Protonix and ultimately needed morphine and Dilaudid for pain control, but did have pain control with such  - admitted to ED observation unit for continued pain control and GI consult in the morning    I reviewed the ED Provider Note dated 7/14/2024 prior to my evaluation of this patient.  I reviewed all labs and imaging performed in the Main ED, prior to patient being placed in Observation. Patient was placed in the ED Observation Unit for upper abdominal pain.    PMHx   Past Medical History:   Diagnosis Date    Asthma     Hypertension       Past Surgical History:   Procedure Laterality Date    CHOLECYSTECTOMY      CORONARY ANGIOGRAPHY N/A 7/13/2023    Procedure: ANGIOGRAM, CORONARY ARTERY;  Surgeon: Jordan Uriostegui MD;  Location: [facility] CATH LAB;  Service: Cardiology;   "Laterality: N/A;    TUBAL LIGATION          Family Hx   No family history on file.     Social Hx   Social History     Socioeconomic History    Marital status:    Tobacco Use    Smoking status: Former    Smokeless tobacco: Never   Substance and Sexual Activity    Alcohol use: No    Drug use: No     Social Determinants of Health     Financial Resource Strain: Low Risk  (7/13/2023)    Overall Financial Resource Strain (CARDIA)     Difficulty of Paying Living Expenses: Not hard at all   Food Insecurity: No Food Insecurity (7/13/2023)    Hunger Vital Sign     Worried About Running Out of Food in the Last Year: Never true     Ran Out of Food in the Last Year: Never true   Transportation Needs: No Transportation Needs (7/13/2023)    PRAPARE - Transportation     Lack of Transportation (Medical): No     Lack of Transportation (Non-Medical): No   Physical Activity: Insufficiently Active (7/13/2023)    Exercise Vital Sign     Days of Exercise per Week: 2 days     Minutes of Exercise per Session: 20 min   Stress: No Stress Concern Present (7/13/2023)    [employer] [place] [employer] - Occupational Stress Questionnaire     Feeling of Stress : Not at all   Housing Stability: Low Risk  (7/13/2023)    Housing Stability Vital Sign     Unable to Pay for Housing in the Last Year: No     Number of Places Lived in the Last Year: 1     Unstable Housing in the Last Year: No        Vital Signs   Vitals:    07/14/24 1812 07/14/24 1903 07/14/24 2018 07/14/24 2057   BP: (!) 141/88 (!) 154/90  129/71   BP Location:  Right arm  Right arm   Patient Position:  Lying  Lying   Pulse: 95 78  82   Resp: 19 17 18 (!) 21   Temp: 97.5 °F (36.4 °C)   97.2 °F (36.2 °C)   TempSrc: Oral   Oral   SpO2: 99% 100%  97%   Weight: 89.8 kg (198 lb)      Height: 5' 4" (1.626 m)           Review of Systems  Review of Systems   Constitutional:  Negative for chills and fever.   HENT:  Negative for congestion, nosebleeds and sore throat.    Eyes:  " Negative for blurred vision, double vision and photophobia.   Respiratory:  Negative for cough and shortness of breath.    Cardiovascular:  Negative for chest pain, claudication and leg swelling.   Gastrointestinal:  Positive for abdominal pain. Negative for diarrhea, nausea and vomiting.   Genitourinary:  Negative for dysuria and urgency.   Musculoskeletal:  Negative for back pain and neck pain.   Skin:  Negative for itching and rash.   Neurological:  Negative for dizziness, weakness and headaches.       Physical Exam  Physical Exam  Constitutional:       General: She is not in acute distress.     Appearance: Normal appearance. She is not toxic-appearing.   HENT:      Head: Normocephalic and atraumatic.      Nose: Nose normal.      Mouth/Throat:      Mouth: Mucous membranes are moist.   Cardiovascular:      Rate and Rhythm: Normal rate and regular rhythm.   Pulmonary:      Effort: Pulmonary effort is normal. No respiratory distress.   Abdominal:      General: Abdomen is flat. There is no distension.      Tenderness: There is abdominal tenderness in the epigastric area.   Musculoskeletal:         General: Normal range of motion.      Cervical back: Normal range of motion.   Skin:     General: Skin is warm and dry.   Neurological:      General: No focal deficit present.      Mental Status: She is alert and oriented to person, place, and time. Mental status is at baseline.   Psychiatric:         Mood and Affect: Mood normal.         Behavior: Behavior normal.         Medications:   Scheduled Meds:   [START ON 7/15/2024] amLODIPine  5 mg Oral Daily    [START ON 7/15/2024] aspirin  81 mg Oral Daily    atorvastatin  80 mg Oral QHS    [START ON 7/15/2024] cetirizine  10 mg Oral Daily    [START ON 7/15/2024] ezetimibe  10 mg Oral Daily    [START ON 7/15/2024] gabapentin  600 mg Oral TID    [START ON 7/15/2024] metoprolol succinate  25 mg Oral Daily    mirtazapine  15 mg Oral QHS    [START ON 7/15/2024] montelukast  10 mg  Oral Daily    [START ON 7/15/2024] pantoprazole  40 mg Intravenous Daily     Continuous Infusions:  PRN Meds:.  Current Facility-Administered Medications:     acetaminophen, 650 mg, Oral, Q8H PRN    HYDROcodone-acetaminophen, 1 tablet, Oral, Q4H PRN    HYDROmorphone, 1 mg, Intravenous, Q4H PRN    melatonin, 6 mg, Oral, Nightly PRN    ondansetron, 4 mg, Intravenous, Q8H PRN    sodium chloride 0.9%, 10 mL, Intravenous, PRN      Assessment/Plan:  Final diagnoses:  [R11.0] Nausea  [R10.13] Epigastric abdominal pain (Primary)    - prn analgesia and antiemetics  - protonix daily  - NPO at midnight  - GI consulted, johana white    Case was discussed with the ED provider, MAXIMILIAN Gale.

## 2024-07-15 NOTE — ED PROVIDER NOTES
"     Source of History:  Patient and medical chart    Chief complaint:  Abdominal Pain (X6 days of diarrhea with NV and epigastric pains. Pepto bismol use without relief. )      HPI:  Naomy Licona is a 59 y.o. female presenting with abdominal pain.  Patient complains of upper abdominal pain for approximately 6 days.  She does report some associated nausea, 1 episode of vomiting yesterday and continued diarrhea.  She states she has noted dark stool.  She states that dark stool was noted prior to initiation of Pepto for symptoms.  She reports no improvement with Imodium.  Does report slight improvement in nausea.  States earlier this week she had fever however fevers have since resolved.  She states decreased appetite secondary to pain.  Food does exacerbate symptoms.  She denies any additional GI symptoms,  symptoms, chest pain or shortness of breath.  She takes hydrocodone for previous injury with only modest improvement symptoms    This is the extent to the patients complaints today here in the emergency department.    ROS: As per HPI     Review of patient's allergies indicates:  No Known Allergies    PMH:  As per HPI and below:  Past Medical History:   Diagnosis Date    Asthma     Hypertension      Past Surgical History:   Procedure Laterality Date    CHOLECYSTECTOMY      CORONARY ANGIOGRAPHY N/A 7/13/2023    Procedure: ANGIOGRAM, CORONARY ARTERY;  Surgeon: Jordan Uriostegui MD;  Location: [facility] CATH LAB;  Service: Cardiology;  Laterality: N/A;    TUBAL LIGATION         Social History     Tobacco Use    Smoking status: Former    Smokeless tobacco: Never   Substance Use Topics    Alcohol use: No    Drug use: No       Physical Exam:    /71 (BP Location: Right arm, Patient Position: Lying)   Pulse 82   Temp 97.2 °F (36.2 °C) (Oral)   Resp (!) 21   Ht 5' 4" (1.626 m)   Wt 89.8 kg (198 lb)   SpO2 97%   BMI 33.99 kg/m²   Nursing note and vital signs reviewed.  Constitutional:  Appears in distress .  " Nontoxic  Eyes: No conjunctival injection.Extraocular muscles are intact.  ENT: Oropharynx clear.  Normal phonation.   Cardiovascular: Regular rate and rhythm.  No murmurs. No gallops. No rubs  Respiratory: Clear to auscultation bilaterally.  Good air movement.  No wheezes.  No rhonchi. No rales. No accessory muscle use..  Abdomen: Soft.  TTP of upper quadrants.  No guarding, rebound or rigidity.  Previous surgical incision noted to have right upper quadrant  Musculoskeletal: Good range of motion all joints.  No deformities.  Neck supple.  No meningismus.  Skin: No rashes seen.  Good turgor.  No abrasions.  No ecchymoses.  Neurologic: Motor intact.  Sensation intact.  No ataxia. No focal neurological deficits.  Psych: Appropriate, conversant    Labs that have been ordered have been independently reviewed and interpreted by myself.    I decided to obtain the patient's medical records.      MDM/ Differential Dx:    Naomy Licona 59 y.o. presented to the ED with c/o abdominal pain. Physical exam reveals nontoxic-appearing female in distress secondary to pain.  Exam notable for TTP right upper, epigastric and left upper quadrant.  No guarding, rebound or rigidity    Differential Diagnosis includes, but is not limited to:  AAA, aortic dissection, mesenteric ischemia, perforated viscous, MI/ACS, SBO/volvulus, incarcerated/strangulated hernia, intussusception, ileus, appendicitis, cholecystitis, cholangitis, diverticulitis, esophagitis, hepatitis, nephrolithiasis, pancreatitis, gastroenteritis, colitis, IBD/IBS, biliary colic, GERD, PUD, constipation, UTI/pyelonephritis,  disorder.      ED management:  EKG reveals normal sinus rhythm at rate 81.  No STEMI, no ectopy, no axis deviation.  No leukocytosis.  Urine unremarkable.  Chemistry unremarkable.  No elevated lipase.  Patient only had modest improvement pain after initial dose of morphine.  Protonix and Dilaudid were ordered as she was melena positive.  Improvement in  pain on reassessment however given presentation and findings believe she would benefit from further observation, hydration and GI evaluation.  CT abdomen with no definitive findings to suggest acute emergent process.     Impression/Plan: Patient informed of diagnosis  upper abdominal pain, nausea and vomiting  Results for orders placed or performed during the hospital encounter of 07/14/24   CBC W/ AUTO DIFFERENTIAL   Result Value Ref Range    WBC 9.95 3.90 - 12.70 K/uL    RBC 4.76 4.00 - 5.40 M/uL    Hemoglobin 13.9 12.0 - 16.0 g/dL    Hematocrit 41.6 37.0 - 48.5 %    MCV 87 82 - 98 fL    MCH 29.2 27.0 - 31.0 pg    MCHC 33.4 32.0 - 36.0 g/dL    RDW 14.6 (H) 11.5 - 14.5 %    Platelets 318 150 - 450 K/uL    MPV 9.0 (L) 9.2 - 12.9 fL    Immature Granulocytes 0.2 0.0 - 0.5 %    Gran # (ANC) 4.5 1.8 - 7.7 K/uL    Immature Grans (Abs) 0.02 0.00 - 0.04 K/uL    Lymph # 4.4 1.0 - 4.8 K/uL    Mono # 0.8 0.3 - 1.0 K/uL    Eos # 0.2 0.0 - 0.5 K/uL    Baso # 0.07 0.00 - 0.20 K/uL    nRBC 0 0 /100 WBC    Gran % 45.4 38.0 - 73.0 %    Lymph % 43.7 18.0 - 48.0 %    Mono % 7.9 4.0 - 15.0 %    Eosinophil % 2.1 0.0 - 8.0 %    Basophil % 0.7 0.0 - 1.9 %    Platelet Estimate Appears normal     Differential Method Automated    Comp. Metabolic Panel   Result Value Ref Range    Sodium 141 136 - 145 mmol/L    Potassium 3.7 3.5 - 5.1 mmol/L    Chloride 107 95 - 110 mmol/L    CO2 23 23 - 29 mmol/L    Glucose 99 70 - 110 mg/dL    BUN 11 6 - 20 mg/dL    Creatinine 1.1 0.5 - 1.4 mg/dL    Calcium 9.8 8.7 - 10.5 mg/dL    Total Protein 7.1 6.0 - 8.4 g/dL    Albumin 3.8 3.5 - 5.2 g/dL    Total Bilirubin 0.3 0.1 - 1.0 mg/dL    Alkaline Phosphatase 125 55 - 135 U/L    AST 13 10 - 40 U/L    ALT 11 10 - 44 U/L    eGFR 58 (A) >60 mL/min/1.73 m^2    Anion Gap 11 8 - 16 mmol/L   Lipase   Result Value Ref Range    Lipase 32 4 - 60 U/L   Urinalysis, Reflex to Urine Culture Urine, Clean Catch    Specimen: Urine   Result Value Ref Range    Specimen UA Urine,  Clean Catch     Color, UA Colorless (A) Yellow, Straw, Dai    Appearance, UA Clear Clear    pH, UA 6.0 5.0 - 8.0    Specific Gravity, UA 1.010 1.005 - 1.030    Protein, UA Negative Negative    Glucose, UA Negative Negative    Ketones, UA Negative Negative    Bilirubin (UA) Negative Negative    Occult Blood UA Negative Negative    Nitrite, UA Negative Negative    Urobilinogen, UA Negative <2.0 EU/dL    Leukocytes, UA Negative Negative   HIV 1/2 Ag/Ab (4th Gen)   Result Value Ref Range    HIV 1/2 Ag/Ab Negative Negative   Hepatitis C Antibody   Result Value Ref Range    Hepatitis C Ab Negative Negative     Imaging Results              CT Abdomen Pelvis With IV Contrast NO Oral Contrast (Final result)  Result time 07/14/24 20:48:27      Final result by Carlos Ortiz MD (07/14/24 20:48:27)                   Impression:      Abdomen CT and Pelvis CT:    At this time, CT demonstrates no evidence of pancreatitis, but please be aware that clinical manifestations of pancreatitis, such as elevations in serum lipase, may precede development of imaging manifestations.  Clinical correlation is therefore recommended.    Status post cholecystectomy.    Mild biliary ductal dilatation is seen favored to represent post cholecystectomy reservoir effect.  Correlate clinically.    Bilateral renal cortical scarring.    No hydronephrosis or urolithiasis.    Incidental left adrenal nodule, not fully characterized on this scan, but statistically favored to represent an adenoma.  If there is strong clinical suspicion for more significant pathology, consider outpatient adrenal mass protocol MRI or CT.    Multi lobular uterine contour, likely on the basis of underlying myomatous changes.  Other underlying uterine pathology not fully excluded.  Correlate clinically.  Consider outpatient pelvic ultrasound or MRI.    Other observations as detailed in the body of the report.      Electronically signed by: Carlos  Angel  Date:    07/14/2024  Time:    20:48               Narrative:    EXAMINATION:  CT ABDOMEN PELVIS WITH IV CONTRAST    CLINICAL HISTORY:  Pancreatitis, acute, severe;    TECHNIQUE:  Low dose axial images, sagittal and coronal reformations were obtained from the lung bases to the pubic symphysis following the IV administration of 100 mL of Omnipaque 350.    COMPARISON:  None.    FINDINGS:  Artifacts related to motion and/or beam hardening degrade portions of the scan.  Allowing for these, the findings are as follows...    Lower chest:    Minimal bibasilar dependent pulmonary opacities, likely atelectatic changes.    Small hiatal hernia.    Otherwise no acute CT abnormality in the visualized lower pleural spaces, caudal portions of the mediastinum and cardiopericardial silhouette, or distal esophagus.    Abdomen:    Mildly prominent right hepatic lobe, possibly developmental variant (Riedel's lobe).    Otherwise, the liver and spleen demonstrate no acute CT abnormalities.    A subcentimeter nodular opacity projecting anterolateral to the splenic hilum and superolateral to the pancreatic tail likely represents a tiny accessory spleen; a nonenlarged lymph node is also possible.    Gallbladder: Absent.    Bile ducts: Mild extrahepatic (10 mm) and central intrahepatic biliary ductal dilatation, possibly representing post cholecystectomy reservoir effect.  No radiopaque choledocholithiasis is demonstrated.    Pancreas: Homogeneous parenchymal enhancement without evidence of peripancreatic stranding or fluid.  No retroperitoneal hematoma wall fluid collection, cyst, or pancreatic necrosis apparent.  Minimal prominence of the pancreatic duct, up to approximately 2.7 mm in diameter on coronal imaging.    Adrenal glands: Normal on the right.  Weakly enhancing 1.7 x 1.4 cm nodule on the left.    Kidneys: Bilateral cortical contour irregularities possibly representing combination of fetal lobation is and cortical  scarring.  Otherwise the nephrograms appear prompt and symmetric.  No radiopaque renal calculi, hydronephrosis, hydroureter, or radiopaque calculi in the ureters, urinary bladder, or urethra.    Stomach: Small hiatal hernia.  The intra-abdominal portion the stomach is poorly distended, limiting CT assessment for mural or mucosal thickening.  Submucosal fat attenuation the stomach is possibly related to chronic inflammation, body habitus, excess endogenous or exogenous steroid, etc..    Duodenum: Minimally narrowed is a crosses midline between the abdominal aorta and SMA.  At this level, the distance between these 2 arteries is approximately 8.5 mm.  The left renal vein is similarly affected.  This CT morphology could be associated with SMA syndrome a nutcracker syndrome, respectively, but in the absence of corresponding clinical findings, is not diagnostic for either.    Small bowel and large bowel: Normal course and caliber.  Mild diverticulosis coli without CT evidence of acute diverticulitis.    Appendix: Normal.    Mesentery and omentum: No acute CT abnormality.    Peritoneal cavity: No free intraperitoneal air or intraperitoneal fluid.    Vasculature: Normal course and caliber of the abdominal aorta and IVC.  Scattered atherosclerotic changes.    Lymph nodes: By size criteria, no abdominopelvic lymphadenopathy is demonstrated.    Urinary bladder: Poorly distended.  This the limits CT assessment for mural thickening.    Uterus: Lobular contour compatible with myomatous changes.  Other underlying urine pathology not fully excluded.    Ovaries: No acute CT abnormalities.    Vagina: No acute CT abnormalities.    External genitalia: Not fully visualized.    Body wall: A tiny umbilical hernia contains normal appearing fat.    Musculoskeletal: Degenerative changes.  No acute CT abnormality.                                                Diagnostic Impression:    1. Nausea                  Karrie Solorzano,  PA  07/14/24 [id number]

## 2024-07-15 NOTE — ED NOTES
URINE COLLECTION PENDING: Pt states she does not need to void at this time. Sterile specimen container and urine clean catch instructions given to patient. Informed pt to call nurse once she has the urge to void.  Pt voiced understanding. Call light within reach and bed locked in low position.

## 2024-07-15 NOTE — ED NOTES
Problem: Mobility  Goal: STG-Within one week, patient will ambulate up/down a curb  Description: 1) Individualized goal:  navigate up/down curb with LRD at [facility] in order to enter/exit home safely  2) Interventions:  PT Group Therapy, PT E Stim Attended, PT Gait Training, PT Therapeutic Exercises, PT Neuro Re-Ed/Balance, PT Therapeutic Activity, PT Manual Therapy and PT Evaluation     Outcome: MET  Goal: STG-Within one week, patient will ambulate household distance  Description: 1) Individualized goal: 150 ft With LRD at [facility] in order to progress towards PLOF  2) Interventions: PT Group Therapy, PT E Stim Attended, PT Gait Training, PT Therapeutic Exercises, PT Neuro Re-Ed/Balance, PT Therapeutic Activity, PT Manual Therapy and PT Evaluation     Outcome: MET     Problem: Mobility Transfers  Goal: STG-Within one week, patient will transfer bed to chair  Description: 1) Individualized goal:  At [facility] with LRD In order to maximize self mobility  2) Interventions:  PT Group Therapy, PT E Stim Attended, PT Gait Training, PT Therapeutic Exercises, PT Neuro Re-Ed/Balance, PT Therapeutic Activity, PT Manual Therapy and PT Evaluation     Outcome: MET     Problem: PT-Long Term Goals  Goal: LTG-By discharge, patient will ambulate  Description: 1) Individualized goal: 150 ft With LRD at mod I in order to progress towards PLOF  2) Interventions: PT Group Therapy, PT E Stim Attended, PT Gait Training, PT Therapeutic Exercises, PT Neuro Re-Ed/Balance, PT Therapeutic Activity, PT Manual Therapy and PT Evaluation     Outcome: MET  Goal: LTG-By discharge, patient will transfer one surface to another  Description: 1) Individualized goal: At mod I with LRD In order to maximize self mobility  2) Interventions: PT Group Therapy, PT E Stim Attended, PT Gait Training, PT Therapeutic Exercises, PT Neuro Re-Ed/Balance, PT Therapeutic Activity, PT Manual Therapy and PT Evaluation     Outcome: MET  Goal: LTG-By discharge, patient will transfer  Pt returned to ED bed 11.   in/out of a car  Description: 1) Individualized goal: At mod I with LRD In order to maximize self mobility  2) Interventions: PT Group Therapy, PT E Stim Attended, PT Gait Training, PT Therapeutic Exercises, PT Neuro Re-Ed/Balance, PT Therapeutic Activity, PT Manual Therapy and PT Evaluation   Outcome: MET  Goal: LTG-By discharge, patient will  Description: 1) Individualized goal: navigate up/down curb with LRD at mod I in order to enter/exit home safely  2) Interventions: PT Group Therapy, PT E Stim Attended, PT Gait Training, PT Therapeutic Exercises, PT Neuro Re-Ed/Balance, PT Therapeutic Activity, PT Manual Therapy and PT Evaluation     Outcome: MET

## 2024-07-15 NOTE — CONSULTS
.[facility] - Emergency Dept (Observation)  Gastroenterology  Consult Note    Patient Name: Naomy Licona  MRN: [id number]  Admission Date: 7/14/2024  Hospital Length of Stay: 0 days  Code Status: Full Code   Primary Care Physician: Guanakito Valdez MD  Principal Problem:<principal problem not specified>    Inpatient consult to Gastroenterology  Consult performed by: Scooter Garcia MD  Consult ordered by: Barbara Freire PA-C        Subjective:     Chief complaint:  Diarrhea, abdominal pain    HPI:  59-year-old woman who began feeling poorly 1 week ago.  Her symptoms started with a headache followed by fever.  She then developed diarrhea 5 or 6 days ago.  She took Imodium and Pepto-Bismol but the diarrhea persisted.  She did note that the day after she took Pepto-Bismol her stool was black.  There was a crampy upper abdominal pain associated with these symptoms.  She reports no sick contacts.  No hematochezia.  There was nausea, but no vomiting.    Her pain has improved significantly.  She received something for abdominal pain last night.  Stool studies have been ordered, but she has not had a bowel movement since arriving to the floor.  Overall, she is feeling better.    Past medical history:  Past Medical History:   Diagnosis Date    Asthma     Hypertension        Past surgical history:  Past Surgical History:   Procedure Laterality Date    CHOLECYSTECTOMY      CORONARY ANGIOGRAPHY N/A 7/13/2023    Procedure: ANGIOGRAM, CORONARY ARTERY;  Surgeon: Jordan Uriostegui MD;  Location: [facility] CATH LAB;  Service: Cardiology;  Laterality: N/A;    TUBAL LIGATION         Social history:  Social History     Socioeconomic History    Marital status:    Tobacco Use    Smoking status: Former    Smokeless tobacco: Never   Substance and Sexual Activity    Alcohol use: No    Drug use: No     Social Determinants of Health     Financial Resource Strain: Low Risk  (7/13/2023)    Overall Financial Resource Strain (CARDIA)      Difficulty of Paying Living Expenses: Not hard at all   Food Insecurity: No Food Insecurity (7/13/2023)    Hunger Vital Sign     Worried About Running Out of Food in the Last Year: Never true     Ran Out of Food in the Last Year: Never true   Transportation Needs: No Transportation Needs (7/13/2023)    PRAPARE - Transportation     Lack of Transportation (Medical): No     Lack of Transportation (Non-Medical): No   Physical Activity: Insufficiently Active (7/13/2023)    Exercise Vital Sign     Days of Exercise per Week: 2 days     Minutes of Exercise per Session: 20 min   Stress: No Stress Concern Present (7/13/2023)    [employer] [place] [employer] - Occupational Stress Questionnaire     Feeling of Stress : Not at all   Housing Stability: Low Risk  (7/13/2023)    Housing Stability Vital Sign     Unable to Pay for Housing in the Last Year: No     Number of Places Lived in the Last Year: 1     Unstable Housing in the Last Year: No       Family history:  No family history on file.    Medications:  (Not in a hospital admission)      Allergies:  Review of patient's allergies indicates:  No Known Allergies    Review of systems:  CONSTITUTIONAL: Negative for weakness, weight loss, weight gain.  HEENT: Negative for hearing or vision changes, nasal congestion, dry mouth, sore throat.  CARDIOVASCULAR: Negative for chest pain or palpitations.  RESPIRATORY: Negative for SOB or cough.  GASTROINTESTINAL: See HPI  GENITOURINARY: Negative for dysuria or hematuria.  MUSCULOSKELETAL: Negative for joint or muscle pain.  NEUROLOGIC: Negative for headaches  Aside from above positives, complete 10 point review of systems negative.    Objective:     Vital Signs (Most Recent):  Temp: 96.9 °F (36.1 °C) (07/14/24 2352)  Pulse: 74 (07/15/24 0418)  Resp: 18 (07/15/24 0418)  BP: 132/68 (07/15/24 0418)  SpO2: 98 % (07/15/24 0418) Vital Signs (24h Range):  Temp:  [96.9 °F (36.1 °C)-97.5 °F (36.4 °C)] 96.9 °F (36.1 °C)  Pulse:   [74-95] 74  Resp:  [17-21] 18  SpO2:  [97 %-100 %] 98 %  BP: (129-154)/(68-90) 132/68     Physical examination:  General: well developed, well nourished, obese, no apparent distress  HENT: NCAT, hearing grossly intact  Eyes:  anicteric sclera  Cardiovascular: Regular rate and rhythm.   Lungs: Non-labored respirations. Breath sounds equal.   Abdomen: soft, mild epigastric tenderness without guarding or rebound, normoactive BS  Extremities: No C/C/E  Neuro: AA&O x 3, no tremors  Psych: Appropriate mood and affect.   Skin: No jaundice  Musculoskeletal: no deformity    Labs:  CBC:   Recent Labs   Lab 07/14/24  1859 07/15/24  0657   WBC 9.95 7.56   HGB 13.9 12.5   HCT 41.6 38.3    280     CMP:   Recent Labs   Lab 07/14/24  1859 07/15/24  0657   GLU 99 105   CALCIUM 9.8 8.9   ALBUMIN 3.8  --    PROT 7.1  --     140   K 3.7 3.5   CO2 23 23    109   BUN 11 8   CREATININE 1.1 0.9   ALKPHOS 125  --    ALT 11  --    AST 13  --    BILITOT 0.3  --      Lipase:   Recent Labs   Lab 07/14/24  1859   LIPASE 32       Imaging:  Imaging reviewed      CT Impression:     Abdomen CT and Pelvis CT:     At this time, CT demonstrates no evidence of pancreatitis, but please be aware that clinical manifestations of pancreatitis, such as elevations in serum lipase, may precede development of imaging manifestations.  Clinical correlation is therefore recommended.     Status post cholecystectomy.     Mild biliary ductal dilatation is seen favored to represent post cholecystectomy reservoir effect.  Correlate clinically.     Bilateral renal cortical scarring.     No hydronephrosis or urolithiasis.     Incidental left adrenal nodule, not fully characterized on this scan, but statistically favored to represent an adenoma.  If there is strong clinical suspicion for more significant pathology, consider outpatient adrenal mass protocol MRI or CT.     Multi lobular uterine contour, likely on the basis of underlying myomatous changes.   Other underlying uterine pathology not fully excluded.  Correlate clinically.  Consider outpatient pelvic ultrasound or MRI.     Other observations as detailed in the body of the report.      Assessment:   59-year-old woman who presented with several days of diarrhea associated with upper abdominal pain.  Fortunately, the diarrhea seems that it may be resolved.  The pain is improving.  This was most likely viral.  Stool studies have been ordered, and can be done when she has another bowel movement.  Her black bowel movements were from the Pepto-Bismol she took the day prior.  Her hemoglobin is stable.    Plan:   1. Start diet   2. Observe  3. If the patient tolerates her diet without worsening pain, then discharge later today or tomorrow can be considered, from the GI standpoint.        Thank you for your consult.     Scooter Garcia MD  Gastroenterology  [facility] - Emergency Dept (Observation)

## 2024-07-16 LAB
C DIFF GDH STL QL: NEGATIVE
C DIFF TOX A+B STL QL IA: NEGATIVE

## 2024-07-16 NOTE — PROGRESS NOTES
ED Observation Unit  Progress Note      HPI   Naomy Licona is a 59 y.o. female presenting with abdominal pain.  Patient complains of upper abdominal pain for approximately 6 days.  She does report some associated nausea, 1 episode of vomiting yesterday and continued diarrhea.  She states she has noted dark stool.  She states that dark stool was noted prior to initiation of Pepto for symptoms.  She reports no improvement with Imodium.  Does report slight improvement in nausea.  States earlier this week she had fever however fevers have since resolved.  She states decreased appetite secondary to pain.  Food does exacerbate symptoms.  She denies any additional GI symptoms,  symptoms, chest pain or shortness of breath.  She takes hydrocodone for previous injury with only modest improvement symptoms       Interval History   Patient ate without any difficulty.  She has no longer having abdominal pain.  GI evaluated the patient felt that if she continued to do well she can be discharged.    PMHx   Past Medical History:   Diagnosis Date    Asthma     Hypertension       Past Surgical History:   Procedure Laterality Date    CHOLECYSTECTOMY      CORONARY ANGIOGRAPHY N/A 7/13/2023    Procedure: ANGIOGRAM, CORONARY ARTERY;  Surgeon: Jordan Uriostegui MD;  Location[place] [facility] [place] [facility] CATH LAB;  Service: Cardiology;  Laterality: N/A;    TUBAL LIGATION          Family Hx   No family history on file.     Social Hx   Social History     Socioeconomic History    Marital status:    Tobacco Use    Smoking status: Former    Smokeless tobacco: Never   Substance and Sexual Activity    Alcohol use: No    Drug use: No     Social Determinants of Health     Financial Resource Strain: Low Risk  (7/13/2023)    Overall Financial Resource Strain (CARDIA)     Difficulty of Paying Living Expenses: Not hard at all   Food Insecurity: No Food Insecurity (7/13/2023)    Hunger Vital Sign     Worried About Running Out of Food in the Last Year: Never true      Ran Out of Food in the Last Year: Never true   Transportation Needs: No Transportation Needs (7/13/2023)    PRAPARE - Transportation     Lack of Transportation (Medical): No     Lack of Transportation (Non-Medical): No   Physical Activity: Insufficiently Active (7/13/2023)    Exercise Vital Sign     Days of Exercise per Week: 2 days     Minutes of Exercise per Session: 20 min   Stress: No Stress Concern Present (7/13/2023)    [employer] - Occupational Stress Questionnaire     Feeling of Stress : Not at all   Housing Stability: Low Risk  (7/13/2023)    Housing Stability Vital Sign     Unable to Pay for Housing in the Last Year: No     Number of Places Lived in the Last Year: 1     Unstable Housing in the Last Year: No        Vital Signs   Vitals:    07/15/24 0853 07/15/24 0918 07/15/24 1237 07/15/24 1426   BP: 135/83 135/83 117/79 120/74   BP Location: Right arm  Right arm Right arm   Patient Position: Lying  Lying Sitting   Pulse: 75 75 79 75   Resp: 16  16 16   Temp: 98 °F (36.7 °C)  98.4 °F (36.9 °C) 98.3 °F (36.8 °C)   TempSrc: Oral  Oral Oral   SpO2: 97%  96% 98%   Weight:       Height:            Review of Systems  Review of Systems   Constitutional: Negative.    HENT: Negative.     Eyes: Negative.    Respiratory: Negative.     Cardiovascular: Negative.    Gastrointestinal: Negative.    Genitourinary: Negative.    Musculoskeletal: Negative.    Skin: Negative.    Neurological: Negative.    Endo/Heme/Allergies: Negative.    Psychiatric/Behavioral: Negative.     All other systems reviewed and are negative.      Brief Physical Exam/Reassessment   Physical Exam  Vitals and nursing note reviewed.   Constitutional:       Appearance: She is well-developed. She is obese.   HENT:      Head: Normocephalic and atraumatic.   Cardiovascular:      Rate and Rhythm: Normal rate and regular rhythm.   Pulmonary:      Effort: Pulmonary effort is normal.      Breath sounds: Normal breath sounds.    Abdominal:      General: Bowel sounds are normal.      Palpations: Abdomen is soft.      Tenderness: There is no abdominal tenderness.   Skin:     General: Skin is warm.      Capillary Refill: Capillary refill takes less than 2 seconds.   Neurological:      General: No focal deficit present.      Mental Status: She is alert and oriented to person, place, and time.   Psychiatric:         Mood and Affect: Mood normal.         Labs/Imaging   Labs Reviewed   CBC W/ AUTO DIFFERENTIAL - Abnormal; Notable for the following components:       Result Value    RDW 14.6 (*)     MPV 9.0 (*)     All other components within normal limits   COMPREHENSIVE METABOLIC PANEL - Abnormal; Notable for the following components:    eGFR 58 (*)     All other components within normal limits   URINALYSIS, REFLEX TO URINE CULTURE - Abnormal; Notable for the following components:    Color, UA Colorless (*)     All other components within normal limits    Narrative:     Specimen Source->Urine   CBC W/ AUTO DIFFERENTIAL - Abnormal; Notable for the following components:    RDW 14.8 (*)     MPV 8.9 (*)     All other components within normal limits   CULTURE, STOOL   CLOSTRIDIUM DIFFICILE   ENTEROHEMORRHAGIC E.COLI   LIPASE   HIV 1 / 2 ANTIBODY    Narrative:     Release to patient->Immediate   HEPATITIS C ANTIBODY    Narrative:     Release to patient->Immediate   BASIC METABOLIC PANEL      Imaging Results              CT Abdomen Pelvis With IV Contrast NO Oral Contrast (Final result)  Result time 07/14/24 20:48:27      Final result by Carlos Ortiz MD (07/14/24 20:48:27)                   Impression:      Abdomen CT and Pelvis CT:    At this time, CT demonstrates no evidence of pancreatitis, but please be aware that clinical manifestations of pancreatitis, such as elevations in serum lipase, may precede development of imaging manifestations.  Clinical correlation is therefore recommended.    Status post cholecystectomy.    Mild biliary ductal  dilatation is seen favored to represent post cholecystectomy reservoir effect.  Correlate clinically.    Bilateral renal cortical scarring.    No hydronephrosis or urolithiasis.    Incidental left adrenal nodule, not fully characterized on this scan, but statistically favored to represent an adenoma.  If there is strong clinical suspicion for more significant pathology, consider outpatient adrenal mass protocol MRI or CT.    Multi lobular uterine contour, likely on the basis of underlying myomatous changes.  Other underlying uterine pathology not fully excluded.  Correlate clinically.  Consider outpatient pelvic ultrasound or MRI.    Other observations as detailed in the body of the report.      Electronically signed by: Carlos Ortiz  Date:    07/14/2024  Time:    20:48               Narrative:    EXAMINATION:  CT ABDOMEN PELVIS WITH IV CONTRAST    CLINICAL HISTORY:  Pancreatitis, acute, severe;    TECHNIQUE:  Low dose axial images, sagittal and coronal reformations were obtained from the lung bases to the pubic symphysis following the IV administration of 100 mL of Omnipaque 350.    COMPARISON:  None.    FINDINGS:  Artifacts related to motion and/or beam hardening degrade portions of the scan.  Allowing for these, the findings are as follows...    Lower chest:    Minimal bibasilar dependent pulmonary opacities, likely atelectatic changes.    Small hiatal hernia.    Otherwise no acute CT abnormality in the visualized lower pleural spaces, caudal portions of the mediastinum and cardiopericardial silhouette, or distal esophagus.    Abdomen:    Mildly prominent right hepatic lobe, possibly developmental variant (Riedel's lobe).    Otherwise, the liver and spleen demonstrate no acute CT abnormalities.    A subcentimeter nodular opacity projecting anterolateral to the splenic hilum and superolateral to the pancreatic tail likely represents a tiny accessory spleen; a nonenlarged lymph node is also  possible.    Gallbladder: Absent.    Bile ducts: Mild extrahepatic (10 mm) and central intrahepatic biliary ductal dilatation, possibly representing post cholecystectomy reservoir effect.  No radiopaque choledocholithiasis is demonstrated.    Pancreas: Homogeneous parenchymal enhancement without evidence of peripancreatic stranding or fluid.  No retroperitoneal hematoma wall fluid collection, cyst, or pancreatic necrosis apparent.  Minimal prominence of the pancreatic duct, up to approximately 2.7 mm in diameter on coronal imaging.    Adrenal glands: Normal on the right.  Weakly enhancing 1.7 x 1.4 cm nodule on the left.    Kidneys: Bilateral cortical contour irregularities possibly representing combination of fetal lobation is and cortical scarring.  Otherwise the nephrograms appear prompt and symmetric.  No radiopaque renal calculi, hydronephrosis, hydroureter, or radiopaque calculi in the ureters, urinary bladder, or urethra.    Stomach: Small hiatal hernia.  The intra-abdominal portion the stomach is poorly distended, limiting CT assessment for mural or mucosal thickening.  Submucosal fat attenuation the stomach is possibly related to chronic inflammation, body habitus, excess endogenous or exogenous steroid, etc..    Duodenum: Minimally narrowed is a crosses midline between the abdominal aorta and SMA.  At this level, the distance between these 2 arteries is approximately 8.5 mm.  The left renal vein is similarly affected.  This CT morphology could be associated with SMA syndrome a nutcracker syndrome, respectively, but in the absence of corresponding clinical findings, is not diagnostic for either.    Small bowel and large bowel: Normal course and caliber.  Mild diverticulosis coli without CT evidence of acute diverticulitis.    Appendix: Normal.    Mesentery and omentum: No acute CT abnormality.    Peritoneal cavity: No free intraperitoneal air or intraperitoneal fluid.    Vasculature: Normal course and  caliber of the abdominal aorta and IVC.  Scattered atherosclerotic changes.    Lymph nodes: By size criteria, no abdominopelvic lymphadenopathy is demonstrated.    Urinary bladder: Poorly distended.  This the limits CT assessment for mural thickening.    Uterus: Lobular contour compatible with myomatous changes.  Other underlying urine pathology not fully excluded.    Ovaries: No acute CT abnormalities.    Vagina: No acute CT abnormalities.    External genitalia: Not fully visualized.    Body wall: A tiny umbilical hernia contains normal appearing fat.    Musculoskeletal: Degenerative changes.  No acute CT abnormality.                                       I reviewed all labs, imaging, and EKGs.     Plan   Monitor the patient after lunch. If she continues to have no pain, she can be discharged.

## 2024-07-16 NOTE — DISCHARGE SUMMARY
ED Observation Unit  Discharge Summary        History of Present Illness:    Naomy Licona is a 59 y.o. female presenting with abdominal pain.  Patient complains of upper abdominal pain for approximately 6 days.  She does report some associated nausea, 1 episode of vomiting yesterday and continued diarrhea.  She states she has noted dark stool.  She states that dark stool was noted prior to initiation of Pepto for symptoms.  She reports no improvement with Imodium.  Does report slight improvement in nausea.  States earlier this week she had fever however fevers have since resolved.  She states decreased appetite secondary to pain.  Food does exacerbate symptoms.  She denies any additional GI symptoms,  symptoms, chest pain or shortness of breath.  She takes hydrocodone for previous injury with only modest improvement symptoms       Observation Course:    Patient was monitored overnight and GI was consulted this a.m..  She no longer had any abdominal pain and GI cut that she can be discharged if she continued to tolerate p.o. without problems.      Consultants:    GI    Final Diagnosis:  gastroenteritis    Discharge Condition: Good    Disposition: Home or Self Care     Time spent on the discharge of the patient including review of hospital course with the patient. reviewing discharge medications and arranging follow-up care 35 minutes.  Patient was seen and examined on the date of discharge and determined to be suitable for discharge.    Follow Up:  Future Appointments   Date Time Provider Department Center   7/26/2024  9:40 AM Jordan Uriostegui MD [id number]

## 2024-07-17 LAB
E COLI SXT1 STL QL IA: NEGATIVE
E COLI SXT2 STL QL IA: NEGATIVE

## 2024-07-18 LAB — BACTERIA STL CULT: NORMAL

## 2024-07-21 ENCOUNTER — HOSPITAL ENCOUNTER (EMERGENCY)
Facility: OTHER | Age: 59
Discharge: HOME | End: 2024-07-21
Attending: EMERGENCY MEDICINE
Payer: MEDICAID

## 2024-07-21 VITALS
TEMPERATURE: 98 F | OXYGEN SATURATION: 96 % | RESPIRATION RATE: 20 BRPM | WEIGHT: 198 LBS | HEIGHT: 64 IN | DIASTOLIC BLOOD PRESSURE: 78 MMHG | BODY MASS INDEX: 33.8 KG/M2 | HEART RATE: 69 BPM | SYSTOLIC BLOOD PRESSURE: 133 MMHG

## 2024-07-21 DIAGNOSIS — R10.32 LEFT LOWER QUADRANT ABDOMINAL PAIN: ICD-10-CM

## 2024-07-21 DIAGNOSIS — R19.7 DIARRHEA, UNSPECIFIED TYPE: Primary | ICD-10-CM

## 2024-07-21 LAB
ALBUMIN SERPL BCP-MCNC: 3.7 G/DL (ref 3.5–5.2)
ALP SERPL-CCNC: 131 U/L (ref 55–135)
ALT SERPL W/O P-5'-P-CCNC: 13 U/L (ref 10–44)
ANION GAP SERPL CALC-SCNC: 12 MMOL/L (ref 8–16)
AST SERPL-CCNC: 13 U/L (ref 10–40)
BASOPHILS # BLD AUTO: 0.05 K/UL (ref 0–0.2)
BASOPHILS NFR BLD: 0.6 % (ref 0–1.9)
BILIRUB SERPL-MCNC: 0.3 MG/DL (ref 0.1–1)
BILIRUB UR QL STRIP: NEGATIVE
BUN SERPL-MCNC: 8 MG/DL (ref 6–20)
CALCIUM SERPL-MCNC: 9.8 MG/DL (ref 8.7–10.5)
CHLORIDE SERPL-SCNC: 106 MMOL/L (ref 95–110)
CLARITY UR: ABNORMAL
CO2 SERPL-SCNC: 22 MMOL/L (ref 23–29)
COLOR UR: YELLOW
CREAT SERPL-MCNC: 1.1 MG/DL (ref 0.5–1.4)
CTP QC/QA: YES
CTP QC/QA: YES
DIFFERENTIAL METHOD BLD: ABNORMAL
EOSINOPHIL # BLD AUTO: 0.2 K/UL (ref 0–0.5)
EOSINOPHIL NFR BLD: 2.7 % (ref 0–8)
ERYTHROCYTE [DISTWIDTH] IN BLOOD BY AUTOMATED COUNT: 14.6 % (ref 11.5–14.5)
EST. GFR  (NO RACE VARIABLE): 58 ML/MIN/1.73 M^2
GLUCOSE SERPL-MCNC: 98 MG/DL (ref 70–110)
GLUCOSE UR QL STRIP: NEGATIVE
HCT VFR BLD AUTO: 42.8 % (ref 37–48.5)
HGB BLD-MCNC: 14.1 G/DL (ref 12–16)
HGB UR QL STRIP: NEGATIVE
IMM GRANULOCYTES # BLD AUTO: 0.02 K/UL (ref 0–0.04)
IMM GRANULOCYTES NFR BLD AUTO: 0.2 % (ref 0–0.5)
KETONES UR QL STRIP: NEGATIVE
LEUKOCYTE ESTERASE UR QL STRIP: NEGATIVE
LYMPHOCYTES # BLD AUTO: 3.8 K/UL (ref 1–4.8)
LYMPHOCYTES NFR BLD: 42.5 % (ref 18–48)
MCH RBC QN AUTO: 29.1 PG (ref 27–31)
MCHC RBC AUTO-ENTMCNC: 32.9 G/DL (ref 32–36)
MCV RBC AUTO: 88 FL (ref 82–98)
MONOCYTES # BLD AUTO: 0.6 K/UL (ref 0.3–1)
MONOCYTES NFR BLD: 7 % (ref 4–15)
NEUTROPHILS # BLD AUTO: 4.1 K/UL (ref 1.8–7.7)
NEUTROPHILS NFR BLD: 47 % (ref 38–73)
NITRITE UR QL STRIP: NEGATIVE
NRBC BLD-RTO: 0 /100 WBC
PH UR STRIP: 6 [PH] (ref 5–8)
PLATELET # BLD AUTO: 331 K/UL (ref 150–450)
PMV BLD AUTO: 8.8 FL (ref 9.2–12.9)
POC MOLECULAR INFLUENZA A AGN: NEGATIVE
POC MOLECULAR INFLUENZA B AGN: NEGATIVE
POTASSIUM SERPL-SCNC: 4 MMOL/L (ref 3.5–5.1)
PROT SERPL-MCNC: 7.3 G/DL (ref 6–8.4)
PROT UR QL STRIP: NEGATIVE
RBC # BLD AUTO: 4.85 M/UL (ref 4–5.4)
SARS-COV-2 RDRP RESP QL NAA+PROBE: NEGATIVE
SODIUM SERPL-SCNC: 140 MMOL/L (ref 136–145)
SP GR UR STRIP: 1.01 (ref 1–1.03)
URN SPEC COLLECT METH UR: ABNORMAL
UROBILINOGEN UR STRIP-ACNC: NEGATIVE EU/DL
WBC # BLD AUTO: 8.82 K/UL (ref 3.9–12.7)

## 2024-07-21 PROCEDURE — 80053 COMPREHEN METABOLIC PANEL: CPT

## 2024-07-21 PROCEDURE — 99284 EMERGENCY DEPT VISIT MOD MDM: CPT

## 2024-07-21 PROCEDURE — 87635 SARS-COV-2 COVID-19 AMP PRB: CPT

## 2024-07-21 PROCEDURE — 25000003 PHARM REV CODE 250

## 2024-07-21 PROCEDURE — 81003 URINALYSIS AUTO W/O SCOPE: CPT

## 2024-07-21 PROCEDURE — 85025 COMPLETE CBC W/AUTO DIFF WBC: CPT

## 2024-07-21 RX ORDER — LOPERAMIDE HYDROCHLORIDE 2 MG/1
2 CAPSULE ORAL 4 TIMES DAILY PRN
Qty: 20 CAPSULE | Refills: 0 | Status: SHIPPED | OUTPATIENT
Start: 2024-07-21 | End: 2024-07-26

## 2024-07-21 RX ORDER — LOPERAMIDE HYDROCHLORIDE 2 MG/1
4 CAPSULE ORAL
Status: COMPLETED | OUTPATIENT
Start: 2024-07-21 | End: 2024-07-21

## 2024-07-21 RX ORDER — DICYCLOMINE HYDROCHLORIDE 10 MG/1
20 CAPSULE ORAL
Status: COMPLETED | OUTPATIENT
Start: 2024-07-21 | End: 2024-07-21

## 2024-07-21 RX ORDER — DICYCLOMINE HYDROCHLORIDE 20 MG/1
20 TABLET ORAL 3 TIMES DAILY PRN
Qty: 20 TABLET | Refills: 0 | Status: SHIPPED | OUTPATIENT
Start: 2024-07-21 | End: 2024-07-28

## 2024-07-21 RX ADMIN — LOPERAMIDE HYDROCHLORIDE 4 MG: 2 CAPSULE ORAL at 11:07

## 2024-07-21 RX ADMIN — DICYCLOMINE HYDROCHLORIDE 20 MG: 10 CAPSULE ORAL at 11:07

## 2024-07-21 NOTE — ED PROVIDER NOTES
Encounter Date: 7/21/2024       History     Chief Complaint   Patient presents with    Abdominal Pain     Pt. Complains of abdominal pain and diarrhea x 5 days. Pt. Was just discharged from here on Monday. Pt. States she is having the same symptoms. Pt. Is alert and ABC's are intact.     This is a 59 y.o female with HTN and asthma who presents to the ED with complaints of LLQ abdominal pain and diarrhea x5 days. Patient was discharged from our observation unit on 7/15/24 after a stay for diarrhea. She has resolution of diarrhea and stool samples were negative. GI evaluated her and suspected viral gastroenteritis. Patient states she had some improvement in diarrhea for a couple of days, but it has since returned. She also reports sharp left lower quadrant abdominal pain as well as generalized abdominal cramping. She took a Norco which helped somewhat for her pain but has since returned. She hasn't has any hematochezia or vomiting. Denies fever, urinary symptoms, weakness, chest pain.    The history is provided by the patient and medical records.     Review of patient's allergies indicates:  No Known Allergies  Past Medical History:   Diagnosis Date    Asthma     Hypertension      Past Surgical History:   Procedure Laterality Date    CHOLECYSTECTOMY      CORONARY ANGIOGRAPHY N/A 7/13/2023    Procedure: ANGIOGRAM, CORONARY ARTERY;  Surgeon: Jordan Uriostegui MD;  Location: [facility] CATH LAB;  Service: Cardiology;  Laterality: N/A;    TUBAL LIGATION       No family history on file.  Social History     Tobacco Use    Smoking status: Former    Smokeless tobacco: Never   Substance Use Topics    Alcohol use: No    Drug use: No     Review of Systems  10 point ROS performed and negative except as stated in HPI   Physical Exam     Initial Vitals [07/21/24 1056]   BP Pulse Resp Temp SpO2   (!) 150/85 86 20 98.1 °F (36.7 °C) 98 %      MAP       --         Physical Exam    Constitutional: She appears well-developed and well-nourished.   Non-toxic appearance. She does not appear ill. No distress.   HENT:   Head: Normocephalic and atraumatic.   Eyes: Conjunctivae are normal.   Neck: Neck supple.   Cardiovascular:  Normal rate and regular rhythm.           Pulmonary/Chest: Effort normal. No tachypnea. No respiratory distress.   Abdominal: Abdomen is soft. There is abdominal tenderness in the left lower quadrant. There is no guarding.   Musculoskeletal:      Cervical back: Neck supple.     Neurological: She is alert and oriented to person, place, and time.   Skin: Skin is warm, dry and intact.   Psychiatric: She has a normal mood and affect. Her speech is normal and behavior is normal.         ED Course   Procedures  Labs Reviewed   CBC W/ AUTO DIFFERENTIAL - Abnormal       Result Value    WBC 8.82      RBC 4.85      Hemoglobin 14.1      Hematocrit 42.8      MCV 88      MCH 29.1      MCHC 32.9      RDW 14.6 (*)     Platelets 331      MPV 8.8 (*)     Immature Granulocytes 0.2      Gran # (ANC) 4.1      Immature Grans (Abs) 0.02      Lymph # 3.8      Mono # 0.6      Eos # 0.2      Baso # 0.05      nRBC 0      Gran % 47.0      Lymph % 42.5      Mono % 7.0      Eosinophil % 2.7      Basophil % 0.6      Differential Method Automated     COMPREHENSIVE METABOLIC PANEL - Abnormal    Sodium 140      Potassium 4.0      Chloride 106      CO2 22 (*)     Glucose 98      BUN 8      Creatinine 1.1      Calcium 9.8      Total Protein 7.3      Albumin 3.7      Total Bilirubin 0.3      Alkaline Phosphatase 131      AST 13      ALT 13      eGFR 58 (*)     Anion Gap 12     URINALYSIS, REFLEX TO URINE CULTURE   SARS-COV-2 RDRP GENE    POC Rapid COVID Negative       Acceptable Yes     POCT INFLUENZA A/B MOLECULAR    POC Molecular Influenza A Ag Negative      POC Molecular Influenza B Ag Negative       Acceptable Yes            Imaging Results    None          Medications   loperamide capsule 4 mg (4 mg Oral Given 7/21/24 1124)   dicyclomine  capsule 20 mg (20 mg Oral Given 7/21/24 1124)     Medical Decision Making  Urgent evaluation of an afebrile 59 y.o female with return of diarrhea and continued LLQ abdominal pain.     Chart review shows:  Pt discharge 7/15 after staying in EDOU for diarrhea. Evaluated by GI who suspected viral gastroenteritis. No white count at that time. Stool studies negative for C. Diff, E. Coli, or neutrophils. CT was negative for diverticulitis or enteritis. Upon discharge, patient had significant improvement in all symptoms.    She appears uncomfortable, but non-toxic. Abdomen is soft with focal LLQ TTP. She remains afebrile with stable vital signs. Does not have a surgical abdomen on exam. Do not feel repeat stool studies necessary as no change in history. Plan for basic labs and treat symptoms. Will also swab for covid and flu. Will consider repeat imaging if no symptomatic improvement or abnormal labs.    Differential diagnosis includes but is not limited to:  Viral gastroenteritis, colitis, diverticulitis, covid, flu, infectious diarrhea    Amount and/or Complexity of Data Reviewed  Labs: ordered. Decision-making details documented in ED Course.    Risk  Prescription drug management.               ED Course as of 07/21/24 1236   Sun Jul 21, 2024   1141 WBC: 8.82 [ANTONIO]   1142 Hemoglobin: 14.1 [ANTONIO]   1205 SARS-CoV-2 RNA, Amplification, Qual: Negative [ANTONIO]   1206 POC Molecular Influenza A Ag: Negative [ANTONIO]   1206 POC Molecular Influenza B Ag: Negative [ANTONIO]   1206 CMP without electrolyte abnormality [ANTONIO]   1206 Creatinine: 1.1  Baseline kidney function [ANTONIO]   1229 Workup grossly unremarkable for emergent etiology of her symptoms. Patient has not had an episode of diarrhea since last night. States some improvement in cramping, but still having some pain. Patient was offered observation again for monitoring and treatment of her symptoms but patient prefers to go home and manage her symptoms with medications at home. She also has  a PCP appointment tomorrow. She was instructed to continue loperamide for diarrhea and bentyl for abdominal pain. She will need close follow up with GI, referral has been placed. Patient educated on signs and symptoms to monitor for and when to return to ED. Patient verbalized understanding agrees with treatment plan. All questions and concerns addressed. [ANTONIO]      ED Course User Index  [ANTONIO] Barbara Freire PA-C                       Clinical Impression:  Final diagnoses:  [R19.7] Diarrhea, unspecified type (Primary)  [R10.32] Left lower quadrant abdominal pain          ED Disposition Condition    Discharge Stable          ED Prescriptions       Medication Sig Dispense Start Date End Date Auth. Provider    loperamide (IMODIUM) 2 mg capsule Take 1 capsule (2 mg total) by mouth 4 (four) times daily as needed for Diarrhea. 20 capsule 7/21/2024 7/26/2024 Barbara Freire PA-C    dicyclomine (BENTYL) 20 mg tablet Take 1 tablet (20 mg total) by mouth 3 (three) times daily as needed (abdominal cramping). 20 tablet 7/21/2024 7/28/2024 Barbara Freire PA-C          Follow-up Information       Follow up With Specialties Details Why Contact Info    [facility], [facility] Schedule an appointment as soon as possible for a visit  for GI follow up [address]  [address]  [facility] [address]  [phone]      [facility] Emergency Medicine Go to  If symptoms worsen [address] [id number] [phone]             Barbara Freire PA-C  07/21/24 1783

## 2024-07-21 NOTE — ED TRIAGE NOTES
Pt reports to ED with LLQ abdominal pain and diarrhea. Pt was seen here and discharged about 5 days ago and was told she had a stomach virus. Pt reports symptoms have not resolved since she went home. Denies vomiting or any new symptoms. VSS. Pt tearful on arrival.

## 2024-07-21 NOTE — DISCHARGE INSTRUCTIONS
While I do not know what is causing your diarrhea, your labs are normal and you are stable for management at home with the need for follow up with GI. Contact [facility] at the provided clinic number to scheduled follow up - I have put a referral in for you.    See your PCP tomorrow as scheduled.    Start taking Loperamide (Imodium) 3 times a day until your diarrhea stops.     Dicyclomine (Bentyl) can be taken up to 3-4 times a day as needed for abdominal pain.    Return to the ER if you develop fever, notice significant blood in your stool, have worsening pain, or are unable to keep water or medications down.

## 2024-07-26 ENCOUNTER — OFFICE VISIT (OUTPATIENT)
Dept: CARDIOLOGY | Facility: CLINIC | Age: 59
End: 2024-07-26
Payer: MEDICAID

## 2024-07-26 VITALS
BODY MASS INDEX: 36.29 KG/M2 | DIASTOLIC BLOOD PRESSURE: 84 MMHG | SYSTOLIC BLOOD PRESSURE: 120 MMHG | HEART RATE: 87 BPM | OXYGEN SATURATION: 99 % | WEIGHT: 211.38 LBS

## 2024-07-26 DIAGNOSIS — E78.5 HYPERLIPIDEMIA, UNSPECIFIED HYPERLIPIDEMIA TYPE: ICD-10-CM

## 2024-07-26 DIAGNOSIS — I10 PRIMARY HYPERTENSION: Primary | ICD-10-CM

## 2024-07-26 PROCEDURE — 99999 PR PBB SHADOW E&M-EST. PATIENT-LVL III: CPT | Mod: PBBFAC,,, | Performed by: INTERNAL MEDICINE

## 2024-07-26 PROCEDURE — 99213 OFFICE O/P EST LOW 20 MIN: CPT | Mod: PBBFAC | Performed by: INTERNAL MEDICINE

## 2024-07-26 NOTE — PROGRESS NOTES
Cardiology    7/26/2024  9:59 AM    Problem list  Patient Active Problem List   Diagnosis    Primary hypertension    Hyperlipidemia    Asthma    Leukocytosis    Anxiety and depression    Chronic neck pain    Viral gastroenteritis       CC:  Follow-up    HPI:  She is here for follow-up.  She has been having chronic diarrhea.  She saw her GI doctor and will be getting endoscopy.  She denies any chest pain.  She maintains hydration with electrolyte drinks.    Medications  Current Outpatient Medications   Medication Sig Dispense Refill    amLODIPine (NORVASC) 5 MG tablet Take 5 mg by mouth once daily.      budesonide-formoterol 160-4.5 mcg (SYMBICORT) 160-4.5 mcg/actuation HFAA Inhale 2 puffs into the lungs every 12 (twelve) hours.      cetirizine (ZYRTEC) 10 MG tablet Take 10 mg by mouth once daily.      dicyclomine (BENTYL) 20 mg tablet Take 1 tablet (20 mg total) by mouth 3 (three) times daily as needed (abdominal cramping). 20 tablet 0    ergocalciferol (ERGOCALCIFEROL) 50,000 unit Cap Take 50,000 Units by mouth every 7 days.      ezetimibe (ZETIA) 10 mg tablet Take 10 mg by mouth.      FLUoxetine 40 MG capsule Take 40 mg by mouth once daily.      gabapentin (NEURONTIN) 600 MG tablet Take 600 mg by mouth 3 (three) times daily.      hydrOXYzine HCL (ATARAX) 25 MG tablet Take 25 mg by mouth once daily.      loperamide (IMODIUM) 2 mg capsule Take 1 capsule (2 mg total) by mouth 4 (four) times daily as needed for Diarrhea. 20 capsule 0    mirtazapine (REMERON) 15 MG tablet Take 15 mg by mouth every evening.      montelukast (SINGULAIR) 10 mg tablet Take 10 mg by mouth.      omeprazole (PRILOSEC) 40 MG capsule Take 40 mg by mouth once daily.      aspirin 81 MG Chew Take 1 tablet (81 mg total) by mouth once daily. 30 tablet 1    atorvastatin (LIPITOR) 80 MG tablet Take 1 tablet (80 mg total) by mouth every evening. 30 tablet 1    dicyclomine (BENTYL) 20 mg tablet Take 1 tablet (20 mg total) by mouth 2 (two) times  daily. 20 tablet 0    metoprolol succinate (TOPROL-XL) 25 MG 24 hr tablet Take 1 tablet (25 mg total) by mouth once daily. 30 tablet 1    nitroGLYCERIN (NITROSTAT) 0.4 MG SL tablet Place 1 tablet (0.4 mg total) under the tongue every 5 (five) minutes as needed for Chest pain. 25 tablet 1     No current facility-administered medications for this visit.      Prior to Admission medications    Medication Sig Start Date End Date Taking? Authorizing Provider   amLODIPine (NORVASC) 5 MG tablet Take 5 mg by mouth once daily.   Yes Provider, Historical   budesonide-formoterol 160-4.5 mcg (SYMBICORT) 160-4.5 mcg/actuation HFAA Inhale 2 puffs into the lungs every 12 (twelve) hours.   Yes Provider, Historical   cetirizine (ZYRTEC) 10 MG tablet Take 10 mg by mouth once daily.   Yes Provider, Historical   dicyclomine (BENTYL) 20 mg tablet Take 1 tablet (20 mg total) by mouth 3 (three) times daily as needed (abdominal cramping). 7/21/24 7/28/24 Yes Barbara Freire PA-C   ergocalciferol (ERGOCALCIFEROL) 50,000 unit Cap Take 50,000 Units by mouth every 7 days. 9/14/23  Yes Provider, Historical   ezetimibe (ZETIA) 10 mg tablet Take 10 mg by mouth. 9/25/23  Yes Provider, Historical   FLUoxetine 40 MG capsule Take 40 mg by mouth once daily.   Yes Provider, Historical   gabapentin (NEURONTIN) 600 MG tablet Take 600 mg by mouth 3 (three) times daily.   Yes Provider, Historical   hydrOXYzine HCL (ATARAX) 25 MG tablet Take 25 mg by mouth once daily.   Yes Provider, Historical   loperamide (IMODIUM) 2 mg capsule Take 1 capsule (2 mg total) by mouth 4 (four) times daily as needed for Diarrhea. 7/21/24 7/26/24 Yes Barbara Freire PA-C   mirtazapine (REMERON) 15 MG tablet Take 15 mg by mouth every evening.   Yes Provider, Historical   montelukast (SINGULAIR) 10 mg tablet Take 10 mg by mouth. 10/9/23  Yes Provider, Historical   omeprazole (PRILOSEC) 40 MG capsule Take 40 mg by mouth once daily.   Yes Provider, Historical   aspirin 81  MG Chew Take 1 tablet (81 mg total) by mouth once daily. 7/14/23 7/13/24  Yvan Mckeon MD   atorvastatin (LIPITOR) 80 MG tablet Take 1 tablet (80 mg total) by mouth every evening. 7/13/23 7/12/24  Yvan Mckeon MD   dicyclomine (BENTYL) 20 mg tablet Take 1 tablet (20 mg total) by mouth 2 (two) times daily. 7/15/24 8/14/24  Yu Del Toro FNP   metoprolol succinate (TOPROL-XL) 25 MG 24 hr tablet Take 1 tablet (25 mg total) by mouth once daily. 7/14/23 7/13/24  Yvan Mckeon MD   nitroGLYCERIN (NITROSTAT) 0.4 MG SL tablet Place 1 tablet (0.4 mg total) under the tongue every 5 (five) minutes as needed for Chest pain. 7/13/23 7/12/24  Yvan Mckeon MD         History  Past Medical History:   Diagnosis Date    Asthma     Hypertension      Past Surgical History:   Procedure Laterality Date    CHOLECYSTECTOMY      CORONARY ANGIOGRAPHY N/A 7/13/2023    Procedure: ANGIOGRAM, CORONARY ARTERY;  Surgeon: Jordan Uriostegui MD;  Location: [facility] CATH LAB;  Service: Cardiology;  Laterality: N/A;    TUBAL LIGATION       Social History     Socioeconomic History    Marital status:    Tobacco Use    Smoking status: Former    Smokeless tobacco: Never   Substance and Sexual Activity    Alcohol use: No    Drug use: No     Social Determinants of Health     Financial Resource Strain: Low Risk  (7/23/2024)    Overall Financial Resource Strain (CARDIA)     Difficulty of Paying Living Expenses: Not hard at all   Food Insecurity: No Food Insecurity (7/23/2024)    Hunger Vital Sign     Worried About Running Out of Food in the Last Year: Never true     Ran Out of Food in the Last Year: Never true   Transportation Needs: No Transportation Needs (7/13/2023)    PRAPARE - Transportation     Lack of Transportation (Medical): No     Lack of Transportation (Non-Medical): No   Physical Activity: Insufficiently Active (7/23/2024)    Exercise Vital Sign     Days of Exercise per Week: 1 day     Minutes of Exercise per Session: 10 min    Stress: Stress Concern Present (7/23/2024)    [employer] - Occupational Stress Questionnaire     Feeling of Stress : To some extent   Housing Stability: Low Risk  (7/13/2023)    Housing Stability Vital Sign     Unable to Pay for Housing in the Last Year: No     Number of Places Lived in the Last Year: 1     Unstable Housing in the Last Year: No         Allergies  Review of patient's allergies indicates:  No Known Allergies      Review of Systems   Review of Systems   Constitutional: Negative for decreased appetite, fever and weight loss.   HENT:  Negative for congestion and nosebleeds.    Eyes:  Negative for double vision, vision loss in left eye, vision loss in right eye and visual disturbance.   Cardiovascular:  Negative for chest pain, claudication, cyanosis, dyspnea on exertion, irregular heartbeat, leg swelling, near-syncope, orthopnea, palpitations, paroxysmal nocturnal dyspnea and syncope.   Respiratory:  Negative for cough, hemoptysis, shortness of breath, sleep disturbances due to breathing, snoring, sputum production and wheezing.    Endocrine: Negative for cold intolerance and heat intolerance.   Skin:  Negative for nail changes and rash.   Musculoskeletal:  Negative for joint pain, muscle cramps, muscle weakness and myalgias.   Gastrointestinal:  Positive for bloating and diarrhea. Negative for change in bowel habit, heartburn, hematemesis, hematochezia, hemorrhoids and melena.   Neurological:  Negative for dizziness, focal weakness and headaches.         Physical Exam  Wt Readings from Last 1 Encounters:   07/26/24 95.9 kg (211 lb 6.4 oz)     BP Readings from Last 3 Encounters:   07/26/24 120/84   07/21/24 133/78   07/15/24 120/74     Pulse Readings from Last 1 Encounters:   07/26/24 87     Body mass index is 36.29 kg/m².    Physical Exam  Vitals reviewed.   Constitutional:       Appearance: She is well-developed. She is obese.   HENT:      Head: Atraumatic.   Eyes:       General: No scleral icterus.  Neck:      Vascular: Normal carotid pulses. No carotid bruit, hepatojugular reflux or JVD.   Cardiovascular:      Rate and Rhythm: Normal rate and regular rhythm.      Chest Wall: PMI is not displaced.      Pulses: Intact distal pulses.           Carotid pulses are 2+ on the right side and 2+ on the left side.       Radial pulses are 2+ on the right side and 2+ on the left side.        Dorsalis pedis pulses are 2+ on the right side and 2+ on the left side.      Heart sounds: Normal heart sounds, S1 normal and S2 normal. No murmur heard.     No friction rub.   Pulmonary:      Effort: Pulmonary effort is normal. No respiratory distress.      Breath sounds: Normal breath sounds. No stridor. No wheezing or rales.   Chest:      Chest wall: No tenderness.   Abdominal:      General: Bowel sounds are normal.      Palpations: Abdomen is soft.   Musculoskeletal:      Cervical back: Neck supple. No edema.   Skin:     General: Skin is warm and dry.      Nails: There is no clubbing.   Neurological:      Mental Status: She is alert and oriented to person, place, and time.   Psychiatric:         Behavior: Behavior normal.         Thought Content: Thought content normal.             Assessment  1. Primary hypertension  Well controlled on current medications, continue medications and monitor    2. Hyperlipidemia, unspecified hyperlipidemia type  Stable.  Continue current medications and monitor        Plan and Discussion  Discussed that she is stable from a cardiac standpoint.  Her blood pressure is well controlled.  Discussed that she had normal coronary angiogram in 2023.  No further cardiac testing.  Patient will follow up with the PCP for further blood pressure management.    Follow Up  As needed      Jordan Uriostegui MD, F.A.C.C, F.S.C.A.I.      Total professional time spent for the encounter: 20 minutes  Time was spent preparing to see the patient, reviewing results of prior testing, obtaining  and/or reviewing separately obtained history, performing a medically appropriate examination and interview, counseling and educating the patient/family, ordering medications/tests/procedures, referring and communicating with other health care professionals, documenting clinical information in the electronic health record, and independently interpreting results.    Disclaimer: This document was created using voice recognition software ([employer]). Although it may be edited, this document may contain errors related to incorrect recognition of the spoken word. Please call the physician if clarification is needed.

## 2025-01-13 ENCOUNTER — HOSPITAL ENCOUNTER (EMERGENCY)
Facility: OTHER | Age: 60
Discharge: HOME | End: 2025-01-13
Attending: EMERGENCY MEDICINE
Payer: MEDICAID

## 2025-01-13 VITALS
TEMPERATURE: 99 F | OXYGEN SATURATION: 98 % | HEIGHT: 64 IN | BODY MASS INDEX: 35.85 KG/M2 | DIASTOLIC BLOOD PRESSURE: 68 MMHG | HEART RATE: 85 BPM | SYSTOLIC BLOOD PRESSURE: 115 MMHG | RESPIRATION RATE: 17 BRPM | WEIGHT: 210 LBS

## 2025-01-13 DIAGNOSIS — R05.9 COUGH: ICD-10-CM

## 2025-01-13 DIAGNOSIS — R51.9 ACUTE NONINTRACTABLE HEADACHE, UNSPECIFIED HEADACHE TYPE: ICD-10-CM

## 2025-01-13 DIAGNOSIS — J06.9 VIRAL URI WITH COUGH: Primary | ICD-10-CM

## 2025-01-13 LAB
CTP QC/QA: YES
CTP QC/QA: YES
POC MOLECULAR INFLUENZA A AGN: NEGATIVE
POC MOLECULAR INFLUENZA B AGN: NEGATIVE
SARS-COV-2 RDRP RESP QL NAA+PROBE: NEGATIVE

## 2025-01-13 PROCEDURE — 94640 AIRWAY INHALATION TREATMENT: CPT

## 2025-01-13 PROCEDURE — 63600175 PHARM REV CODE 636 W HCPCS: Performed by: PHYSICIAN ASSISTANT

## 2025-01-13 PROCEDURE — 25000242 PHARM REV CODE 250 ALT 637 W/ HCPCS: Performed by: PHYSICIAN ASSISTANT

## 2025-01-13 PROCEDURE — 87635 SARS-COV-2 COVID-19 AMP PRB: CPT | Performed by: EMERGENCY MEDICINE

## 2025-01-13 PROCEDURE — 99283 EMERGENCY DEPT VISIT LOW MDM: CPT | Mod: 25

## 2025-01-13 PROCEDURE — 25000003 PHARM REV CODE 250: Performed by: PHYSICIAN ASSISTANT

## 2025-01-13 RX ORDER — BUTALBITAL, ACETAMINOPHEN AND CAFFEINE 50; 325; 40 MG/1; MG/1; MG/1
1 TABLET ORAL
Status: COMPLETED | OUTPATIENT
Start: 2025-01-13 | End: 2025-01-13

## 2025-01-13 RX ORDER — IBUPROFEN 600 MG/1
600 TABLET ORAL
Status: COMPLETED | OUTPATIENT
Start: 2025-01-13 | End: 2025-01-13

## 2025-01-13 RX ORDER — PROCHLORPERAZINE MALEATE 5 MG
10 TABLET ORAL
Status: COMPLETED | OUTPATIENT
Start: 2025-01-13 | End: 2025-01-13

## 2025-01-13 RX ORDER — IPRATROPIUM BROMIDE AND ALBUTEROL SULFATE 2.5; .5 MG/3ML; MG/3ML
3 SOLUTION RESPIRATORY (INHALATION)
Status: COMPLETED | OUTPATIENT
Start: 2025-01-13 | End: 2025-01-13

## 2025-01-13 RX ORDER — DIPHENHYDRAMINE HCL 25 MG
50 CAPSULE ORAL
Status: COMPLETED | OUTPATIENT
Start: 2025-01-13 | End: 2025-01-13

## 2025-01-13 RX ADMIN — IBUPROFEN 600 MG: 600 TABLET, FILM COATED ORAL at 08:01

## 2025-01-13 RX ADMIN — BUTALBITAL, ACETAMINOPHEN, AND CAFFEINE 1 TABLET: 50; 325; 40 TABLET ORAL at 08:01

## 2025-01-13 RX ADMIN — IPRATROPIUM BROMIDE AND ALBUTEROL SULFATE 3 ML: 2.5; .5 SOLUTION RESPIRATORY (INHALATION) at 08:01

## 2025-01-13 RX ADMIN — PROCHLORPERAZINE MALEATE 10 MG: 5 TABLET ORAL at 08:01

## 2025-01-13 RX ADMIN — DIPHENHYDRAMINE HYDROCHLORIDE 50 MG: 25 CAPSULE ORAL at 08:01

## 2025-01-13 NOTE — ED TRIAGE NOTES
Pt presents to ED c/o  nonproductive cough with headache x1 week. Denies improvement with OTC meds. Hx of asthma. + exp wheezes. Denies any sick contacts.

## 2025-01-13 NOTE — DISCHARGE INSTRUCTIONS
Make sure you stay hydrated.  Rotating Tylenol and Motrin.  Keep your appointment scheduled with Neurology on Thursday.  Return to the emergency department with any worsening symptoms or concerns.  Thank you for allowing me to take care of you today.

## 2025-01-13 NOTE — ED PROVIDER NOTES
Encounter Date: 1/13/2025       History     Chief Complaint   Patient presents with    Headache     Cough and headache for a week, no relief with OTC meds.      Patient is a 59-year-old female with history of hypertension and asthma who presents to the emergency department with multiple symptoms.  Patient reports last Saturday she started with body aches and chills.  Reports cough and congestion then started.  Reports she checked herself for COVID but it was negative.  Reports over the last week her symptoms have worsened.  Reports she has a very deep cough.  Reports her asthma is flared and she has been having to use albuterol more frequently.  Reports over the last 4 days she has had a constant headache.  Denies neck stiffness.  Reports she does have history of migraines, and she is scheduled to see a neurologist this Thursday.  Denies any visual disruption.  Reports she is taking Excedrin migraine with no relief of her pain.    The history is provided by the patient.     Review of patient's allergies indicates:  No Known Allergies  Past Medical History:   Diagnosis Date    Asthma     Hypertension      Past Surgical History:   Procedure Laterality Date    CHOLECYSTECTOMY      CORONARY ANGIOGRAPHY N/A 7/13/2023    Procedure: ANGIOGRAM, CORONARY ARTERY;  Surgeon: Jordan Uriostegui MD;  Location: [facility] CATH LAB;  Service: Cardiology;  Laterality: N/A;    TUBAL LIGATION       No family history on file.  Social History     Tobacco Use    Smoking status: Former    Smokeless tobacco: Never   Substance Use Topics    Alcohol use: No    Drug use: No     Review of Systems   Constitutional:  Positive for activity change, appetite change, chills, fatigue and fever.   HENT:  Positive for congestion, postnasal drip and rhinorrhea. Negative for ear discharge, ear pain, sore throat and trouble swallowing.    Respiratory:  Positive for cough, chest tightness and wheezing.    Cardiovascular:  Negative for chest pain.    Gastrointestinal:  Negative for abdominal pain, blood in stool, constipation, diarrhea, nausea and vomiting.   Genitourinary:  Negative for dysuria, flank pain and hematuria.   Musculoskeletal:  Negative for back pain, neck pain and neck stiffness.   Skin:  Negative for rash and wound.   Neurological:  Positive for headaches. Negative for dizziness and light-headedness.       Physical Exam     Initial Vitals [01/13/25 0811]   BP Pulse Resp Temp SpO2   (!) 160/93 86 18 98.4 °F (36.9 °C) 99 %      MAP       --         Physical Exam    Nursing note and vitals reviewed.  Constitutional: She appears well-developed and well-nourished. She is not diaphoretic.  Non-toxic appearance. No distress.   HENT:   Head: Normocephalic.   Right Ear: Hearing and external ear normal. A middle ear effusion is present.   Left Ear: Hearing and external ear normal. A middle ear effusion is present.   Nose: Mucosal edema and rhinorrhea present. Mouth/Throat: Uvula is midline, oropharynx is clear and moist and mucous membranes are normal. No trismus in the jaw. No uvula swelling. No oropharyngeal exudate.   Posterior oropharyngeal cobblestoning   Eyes: Conjunctivae and EOM are normal. Pupils are equal, round, and reactive to light.   Neck: Neck supple.   Normal range of motion.   Full passive range of motion without pain.     Cardiovascular:  Normal rate and regular rhythm.           No lower extremity edema   Pulmonary/Chest: No respiratory distress. She has wheezes (Faint wheezing in all lung fields). She has rhonchi (right lower lung field). She has no rales. She exhibits no tenderness.   Abdominal: Abdomen is soft. Bowel sounds are normal. There is no abdominal tenderness.   Musculoskeletal:         General: Normal range of motion.      Cervical back: Full passive range of motion without pain, normal range of motion and neck supple. No rigidity. Normal range of motion.     Neurological: She is alert and oriented to person, place, and  time. She has normal strength. No cranial nerve deficit or sensory deficit. GCS score is 15. GCS eye subscore is 4. GCS verbal subscore is 5. GCS motor subscore is 6.   Skin: Skin is warm and dry. Capillary refill takes less than 2 seconds.   Psychiatric: She has a normal mood and affect.         ED Course   Procedures  Labs Reviewed   SARS-COV-2 RDRP GENE       Result Value    POC Rapid COVID Negative       Acceptable Yes     POCT INFLUENZA A/B MOLECULAR    POC Molecular Influenza A Ag Negative      POC Molecular Influenza B Ag Negative       Acceptable Yes            Imaging Results              X-Ray Chest PA And Lateral (Final result)  Result time 01/13/25 08:48:58      Final result by Luz Jeffery MD (01/13/25 08:48:58)                   Impression:      No acute cardiopulmonary process seen.      Electronically signed by: Luz Jeffery  Date:    01/13/2025  Time:    08:48               Narrative:    EXAMINATION:  XR CHEST PA AND LATERAL    CLINICAL HISTORY:  Cough, unspecified    TECHNIQUE:  PA and lateral views of the chest were performed.    COMPARISON:  07/12/2023    FINDINGS:  Lungs are well expanded.  No acute consolidation, pleural effusion, or pneumothorax seen.    Cardiac silhouette is normal in size.                                       Medications   albuterol-ipratropium 2.5 mg-0.5 mg/3 mL nebulizer solution 3 mL (3 mLs Nebulization Given 1/13/25 0855)   prochlorperazine tablet 10 mg (10 mg Oral Given 1/13/25 0839)   diphenhydrAMINE capsule 50 mg (50 mg Oral Given 1/13/25 0839)   ibuprofen tablet 600 mg (600 mg Oral Given 1/13/25 0839)   butalbital-acetaminophen-caffeine -40 mg per tablet 1 tablet (1 tablet Oral Given 1/13/25 0840)     Medical Decision Making  Urgent evaluation of a 59-year-old female with history of hypertension and asthma who presents to the emergency department with multiple symptoms.  Patient is afebrile and hypertensive.  Patient is  nontoxic appearing.  Faint wheezing in all lung fields noted on auscultation.  Rhonchi noted in the right lower lung field.  No lower extremity edema.  Patient is satting at 99% on room air.  Nasal mucosal edema with posterior oropharyngeal cobblestoning.  No nuchal rigidity.  Normal neuro exam.  I suspect patient has a viral syndrome causing her headaches to be worse.  Not concerned for meningitis or acute intracranial process.  Will give a breathing treatment and re-evaluate.  Will test for COVID and flu.  Will give migraine concoction.    9:08 AM  Negative flu and covid.    9:59 AM  Chest xray shows no acute abnormality specifically no pneumonia.  Lungs much clearer after breathing treatments.  Pt is not requiring steroid burst at this time.  Headache is much improved after migraine concoction.  Advised to keep appt with neurology on Thursday.  Advised to return to ED with any worsening symptoms or concerns.    Amount and/or Complexity of Data Reviewed  Labs: ordered.  Radiology: ordered.    Risk  OTC drugs.  Prescription drug management.                                      Clinical Impression:  Final diagnoses:  [R05.9] Cough  [J06.9] Viral URI with cough (Primary)  [R51.9] Acute nonintractable headache, unspecified headache type          ED Disposition Condition    Discharge Stable          ED Prescriptions    None       Follow-up Information       Follow up With Specialties Details Why Contact Info Additional Information    Guanakito Valdez MD Family Medicine   [address] [place] [address] B  [facility] [address]  [phone]       [facility] Neu[facility]   [address] [place] [address], [address]  [facility] [address] [phone] Turn at Entrance 1 on [facility] in [facility] and take elevators to Floor 2. Follow signs to [facility]. Take [facility]vators to Floor 5 for [address].             Lisbeth Fenton PA-C  01/13/25  1002

## 2025-07-30 ENCOUNTER — HOSPITAL ENCOUNTER (EMERGENCY)
Facility: OTHER | Age: 60
Discharge: HOME | End: 2025-07-30
Attending: EMERGENCY MEDICINE
Payer: MEDICAID

## 2025-07-30 VITALS
BODY MASS INDEX: 34.31 KG/M2 | DIASTOLIC BLOOD PRESSURE: 69 MMHG | WEIGHT: 201 LBS | SYSTOLIC BLOOD PRESSURE: 136 MMHG | HEART RATE: 80 BPM | TEMPERATURE: 98 F | OXYGEN SATURATION: 95 % | RESPIRATION RATE: 16 BRPM | HEIGHT: 64 IN

## 2025-07-30 DIAGNOSIS — M54.50 ACUTE RIGHT-SIDED LOW BACK PAIN WITHOUT SCIATICA: Primary | ICD-10-CM

## 2025-07-30 LAB
BILIRUB UR QL STRIP.AUTO: NEGATIVE
CLARITY UR: CLEAR
COLOR UR AUTO: YELLOW
GLUCOSE UR QL STRIP: NEGATIVE
HGB UR QL STRIP: NEGATIVE
KETONES UR QL STRIP: NEGATIVE
LEUKOCYTE ESTERASE UR QL STRIP: NEGATIVE
NITRITE UR QL STRIP: NEGATIVE
PH UR STRIP: 6 [PH]
PROT UR QL STRIP: ABNORMAL
SP GR UR STRIP: 1.02
UROBILINOGEN UR STRIP-ACNC: NEGATIVE EU/DL

## 2025-07-30 PROCEDURE — 63600175 PHARM REV CODE 636 W HCPCS: Mod: JZ,TB

## 2025-07-30 PROCEDURE — 25000003 PHARM REV CODE 250

## 2025-07-30 PROCEDURE — 81003 URINALYSIS AUTO W/O SCOPE: CPT

## 2025-07-30 PROCEDURE — 96372 THER/PROPH/DIAG INJ SC/IM: CPT

## 2025-07-30 PROCEDURE — 99284 EMERGENCY DEPT VISIT MOD MDM: CPT | Mod: 25

## 2025-07-30 RX ORDER — LIDOCAINE 50 MG/G
2 PATCH TOPICAL
Status: DISCONTINUED | OUTPATIENT
Start: 2025-07-30 | End: 2025-07-31 | Stop reason: HOSPADM

## 2025-07-30 RX ORDER — METHYLPREDNISOLONE 4 MG/1
TABLET ORAL
Qty: 21 EACH | Refills: 0 | Status: SHIPPED | OUTPATIENT
Start: 2025-07-30 | End: 2025-08-20

## 2025-07-30 RX ORDER — METHOCARBAMOL 500 MG/1
1000 TABLET, FILM COATED ORAL 3 TIMES DAILY PRN
Qty: 15 TABLET | Refills: 0 | Status: SHIPPED | OUTPATIENT
Start: 2025-07-30 | End: 2025-08-04

## 2025-07-30 RX ORDER — LIDOCAINE 50 MG/G
2 PATCH TOPICAL DAILY
Qty: 10 PATCH | Refills: 0 | Status: SHIPPED | OUTPATIENT
Start: 2025-07-30 | End: 2025-08-04

## 2025-07-30 RX ORDER — METHOCARBAMOL 500 MG/1
1000 TABLET, FILM COATED ORAL
Status: COMPLETED | OUTPATIENT
Start: 2025-07-30 | End: 2025-07-30

## 2025-07-30 RX ORDER — DICLOFENAC SODIUM 10 MG/G
2 GEL TOPICAL 2 TIMES DAILY
Qty: 100 G | Refills: 0 | Status: SHIPPED | OUTPATIENT
Start: 2025-07-30 | End: 2025-08-04

## 2025-07-30 RX ORDER — KETOROLAC TROMETHAMINE 30 MG/ML
10 INJECTION, SOLUTION INTRAMUSCULAR; INTRAVENOUS
Status: COMPLETED | OUTPATIENT
Start: 2025-07-30 | End: 2025-07-30

## 2025-07-30 RX ADMIN — METHOCARBAMOL 1000 MG: 500 TABLET ORAL at 09:07

## 2025-07-30 RX ADMIN — KETOROLAC TROMETHAMINE 9.9 MG: 30 INJECTION, SOLUTION INTRAMUSCULAR at 09:07

## 2025-07-30 RX ADMIN — LIDOCAINE 2 PATCH: 50 PATCH CUTANEOUS at 10:07

## 2025-07-31 LAB — HOLD SPECIMEN: NORMAL

## 2025-07-31 NOTE — ED NOTES
Naomy Licona, a 60 y.o. female presents to the ED with 10/10 R side lumbar pain, non-radiating, over the past several days. Pt reports getting a steroid injection for the same in Feb 2024 to cover the following 12-18 months. Denies recent fall, injury, muscle strain, lifting. Pt had leftover Norco at home, took last dose at 1500 today. No GI/ concerns. Pt ambulatory with difficulty, states pain is worse with sitting or lying. Visitor with pt.     ED workup in progress. Safety measures in place. Denies further needs. Plan of care ongoing.      Chief Complaint   Patient presents with    Back Pain     R lumbar pain x several days. No falls, heavy lifting, injury pt can think of. Had steroid injection >1 yr ago. Last took Norco 10 at 1500. No bowel/bladder issues. Pain worse with sitting/lying.      Review of patient's allergies indicates:  No Known Allergies  Past Medical History:   Diagnosis Date    Asthma     Hypertension      Past Surgical History:   Procedure Laterality Date    CHOLECYSTECTOMY      CORONARY ANGIOGRAPHY N/A 7/13/2023    Procedure: ANGIOGRAM, CORONARY ARTERY;  Surgeon: Jordan Uriostegui MD;  Location: [facility] CATH LAB;  Service: Cardiology;  Laterality: N/A;    TUBAL LIGATION

## 2025-07-31 NOTE — ED PROVIDER NOTES
Encounter Date: 7/30/2025       History     Chief Complaint   Patient presents with    Back Pain     R lumbar pain x several days. No falls, heavy lifting, injury pt can think of. Had steroid injection >1 yr ago. Last took Norco 10 at 1500. No bowel/bladder issues. Pain worse with sitting/lying.      Naomy Licona is a 60 y.o. female with history of chronic lower back pain and followed by pain management presenting to the emergency department for evaluation of right lower back pain for 4 days. Reports that she was sitting when the pain started.  Reports pain is worse with weight-bearing applying pressure to the right side.  She denies recent falls, direct trauma to the area or heavy lifting. States that she did start walking 2 miles for exercise but has not walked in a few days. No pain relief with her home [place], last taken around 2:30 pm today. She denies fever, chills, weight loss, numbness, tingling, urinary symptoms, bladder or bowel incontinence, perianal numbness, or urinary retention. She states that she has not seen pain management since last years. Notes that she did receive a steroid injection into her back one year ago.       The history is provided by the patient ( at bedside).     Review of patient's allergies indicates:  No Known Allergies  Past Medical History:   Diagnosis Date    Asthma     Hypertension      Past Surgical History:   Procedure Laterality Date    CHOLECYSTECTOMY      CORONARY ANGIOGRAPHY N/A 7/13/2023    Procedure: ANGIOGRAM, CORONARY ARTERY;  Surgeon: Jordan Uriostegui MD;  Location: [facility] CATH LAB;  Service: Cardiology;  Laterality: N/A;    TUBAL LIGATION       No family history on file.  Social History[1]    Review of Systems  As per HPI.    Physical Exam     Initial Vitals [07/30/25 2010]   BP Pulse Resp Temp SpO2   (!) 153/92 97 16 98 °F (36.7 °C) 97 %      MAP       --         Physical Exam    Nursing note and vitals reviewed.  Constitutional: She appears well-developed  and well-nourished. No distress.   HENT:   Head: Normocephalic and atraumatic. Mouth/Throat: Oropharynx is clear and moist.   Eyes: Conjunctivae and EOM are normal.   Neck: Neck supple.   Normal range of motion.  Musculoskeletal:         General: No edema. Normal range of motion.      Cervical back: Normal range of motion and neck supple.      Comments: No midline tenderness to palpation of the lumbar spine.  No crepitus, step-offs, or deformities.  Right paraspinal lumbar tenderness to palpation.  Positive straight leg raise on the right.     Neurological: She is alert and oriented to person, place, and time. She has normal strength.   Skin: Skin is warm and dry.   Psychiatric: She has a normal mood and affect. Her behavior is normal. Judgment and thought content normal.         ED Course   Procedures  Labs Reviewed   URINALYSIS, REFLEX TO URINE CULTURE - Abnormal       Result Value    Color, UA Yellow      Appearance, UA Clear      pH, UA 6.0      Spec Grav UA 1.025      Protein, UA Trace (*)     Glucose, UA Negative      Ketones, UA Negative      Bilirubin, UA Negative      Blood, UA Negative      Nitrites, UA Negative      Urobilinogen, UA Negative      Leukocyte Esterase, UA Negative     GREY TOP URINE HOLD          Imaging Results              X-Ray Lumbar Spine Ap And Lateral (Final result)  Result time 07/30/25 22:11:33      Final result by Benji Peñaloza MD (07/30/25 22:11:33)                   Impression:      Degenerative changes in the mid lumbar spine with no acute findings.      Electronically signed by: Benji Peñaloza  Date:    07/30/2025  Time:    22:11               Narrative:    EXAMINATION:  XR LUMBAR SPINE AP AND LATERAL    CLINICAL HISTORY:  low back pain;    TECHNIQUE:  AP, lateral and spot images were performed of the lumbar spine.    COMPARISON:  None    FINDINGS:  Alignment is anatomic.  Marginal osteophytes and syndesmophytes at L3-4 and L4-5.  No fracture or subluxation.  Adjacent  bony structures and soft tissues intact.  Phlebolith in the left pelvis.  SI joint sharp.                                       Medications   LIDOcaine 5 % patch 2 patch (2 patches Transdermal Patch Applied 7/30/25 2216)   ketorolac injection 9.9 mg (9.9 mg Intramuscular Given 7/30/25 2140)   methocarbamoL tablet 1,000 mg (1,000 mg Oral Given 7/30/25 2132)     Medical Decision Making  Amount and/or Complexity of Data Reviewed  Radiology: ordered.    Risk  Prescription drug management.                           Medical Decision Making:   Initial Assessment:   Urgent evaluation of 60 y.o. female with history of chronic lower back pain and followed by pain management presenting to the emergency department for atraumatic right lower back pain for 4 days.  No pain relief with home Norco.  She denies fever, chills, paresthesias, or symptoms associated with cauda equina syndrome.  She has been ambulatory.  On exam, she is uncomfortable appearing but nontoxic.  Hemodynamically stable.  Afebrile in the ED. No midline tenderness to palpation of the lumbar spine.  No crepitus, step-offs, or deformities.  Right paraspinal lumbar tenderness to palpation.  Positive straight leg raise on the right.  Plan for UA and x-ray of the lumbar spine.  Will give Toradol, Robaxin, lidocaine patches.  Differential Diagnosis:   Differential diagnosis includes but not limited to lumbar strain, sprain, fracture, subluxation, degenerative disc disease, sciatica, mass  Clinical Tests:   Lab Tests: Ordered and Reviewed  Radiological Study: Ordered and Reviewed  ED Management:  No UTI on UA.  On review of x-ray of the lumbar spine, no acute fractures or subluxations.  No mass.  There are degenerative changes.  I updated the patient and her  on all results.  She does report improvement of her pain after receiving medications in the ED.  we will trial course of muscle relaxer and Medrol Dosepak.  Also recommended Voltaren gel and lidocaine  patches.  Stressed importance of following up with her pain management provider.  Ambulatory referral to orthopedics and physical therapy were provided.  Patient verbalized understanding and agreement with this plan of care. She was given specific return precautions. Advised to follow up with PCP as needed. All questions and concerns addressed. She is stable for discharge.     This note was created with MModal Fluency Direct Dictation. Please excuse any spelling or grammatical errors.                Clinical Impression:  Final diagnoses:  [M54.50] Acute right-sided low back pain without sciatica (Primary)          ED Disposition Condition    Discharge Stable          ED Prescriptions       Medication Sig Dispense Start Date End Date Auth. Provider    methylPREDNISolone (MEDROL DOSEPACK) 4 mg tablet use as directed 21 each 7/30/2025 8/20/2025 Artie Mitchell PA-C    methocarbamoL (ROBAXIN) 500 MG Tab Take 2 tablets (1,000 mg total) by mouth 3 (three) times daily as needed (back pain). 15 tablet 7/30/2025 8/4/2025 Artie Mitchell PA-C    diclofenac sodium (VOLTAREN) 1 % Gel Apply 2 g topically 2 (two) times daily. for 5 days 100 g 7/30/2025 8/4/2025 Artie Mitchell PA-C    LIDOcaine (LIDODERM) 5 % Place 2 patches onto the skin once daily. Remove & Discard patch within 12 hours or as directed by MD for 5 days 10 patch 7/30/2025 8/4/2025 Artie Mitchell PA-C          Follow-up Information    None       Launch MDCalc MDM  MDCalc MDM Module  Jul 30 2025 10:27 PM [Artie Mitchell]  Data:  - Independent interpretation: I independently reviewed the XR L-spine AP+Lat. See MDM section and/or ED Course for my interpretation. [Artie Mitchell]  - Test/documents/historian: 2 tests ordered  Additional encounter diagnoses: Acute right-sided low back pain without sciatica  Risk: LIDOcaine patch + 4 more (Rx drug management)             [1]   Social History  Tobacco Use    Smoking status: Former    Smokeless tobacco: Never   Vaping Use    Vaping  status: Never Used   Substance Use Topics    Alcohol use: No    Drug use: No        Artie Mitchell PA-C  07/30/25 1440

## 2025-07-31 NOTE — ED TRIAGE NOTES
"Pt arrives to ED with c/o chronic right lower back pain that has worsened from Saturday until today. Pt states that she "goes to pain management for injections but recently she got her nerve cauterized in March and the pain has come back." Pt reports painful ambulation on the affected side, and pain when pressure is applied. Pt denies numbness, tingling, dizziness, or any recent falls.Pt AAOx4, NAD noted.  "

## 2025-07-31 NOTE — DISCHARGE INSTRUCTIONS
Take prescribed medications as needed for your back pain. Avoid any heavy lifting or strenuous exercise. You still need to move around to avoid becoming stiff.  Reserve Norco for severe pain only.  Do not operate machinery or any vehicle after taking muscle relaxer or Norco.  Follow up with your pain management doctor for further mgmt of pain. Ambulatory referral to back and spine and PT have been provided.

## (undated) DEVICE — DRAPE ANGIO BRACH 38X44IN

## (undated) DEVICE — SYR CNTRL MALE LL 10ML

## (undated) DEVICE — BAG DRAINAGE W/SPIKE

## (undated) DEVICE — GUIDEWIRE ANGIO 1.5MM .035X180

## (undated) DEVICE — DRESSING TRANS 4X4 TEGADERM

## (undated) DEVICE — CATH OPTITORQUE RADIAL 5FR

## (undated) DEVICE — KIT GLIDESHEATH SLEND 6FR 10CM

## (undated) DEVICE — TRAY CORONARY CUSTOM BAPTIST

## (undated) DEVICE — HEMOSTAT VASC BAND REG 24CM

## (undated) DEVICE — MANIFOLD PERCEPTOR MP 3P RH ON

## (undated) DEVICE — KIT PROBE COVER WITH GEL